# Patient Record
Sex: MALE | Race: WHITE | NOT HISPANIC OR LATINO | URBAN - METROPOLITAN AREA
[De-identification: names, ages, dates, MRNs, and addresses within clinical notes are randomized per-mention and may not be internally consistent; named-entity substitution may affect disease eponyms.]

---

## 2017-01-01 ENCOUNTER — HOSPITAL ENCOUNTER (OUTPATIENT)
Dept: GENERAL RADIOLOGY | Facility: CLINIC | Age: 82
End: 2017-06-08
Attending: ORTHOPAEDIC SURGERY | Admitting: ORTHOPAEDIC SURGERY
Payer: MEDICARE

## 2017-01-01 ENCOUNTER — HOSPITAL ENCOUNTER (OUTPATIENT)
Dept: CARDIOLOGY | Facility: CLINIC | Age: 82
Discharge: HOME | End: 2017-08-18
Attending: INTERNAL MEDICINE

## 2017-01-01 ENCOUNTER — OFFICE VISIT - HEALTHEAST (OUTPATIENT)
Dept: CARDIOLOGY | Facility: CLINIC | Age: 82
End: 2017-01-01

## 2017-01-01 ENCOUNTER — RECORDS - HEALTHEAST (OUTPATIENT)
Dept: ADMINISTRATIVE | Facility: OTHER | Age: 82
End: 2017-01-01

## 2017-01-01 ENCOUNTER — HOSPITAL ENCOUNTER (OUTPATIENT)
Dept: CARDIOLOGY | Facility: CLINIC | Age: 82
Discharge: HOME | End: 2017-12-12
Attending: INTERNAL MEDICINE

## 2017-01-01 ENCOUNTER — APPOINTMENT (OUTPATIENT)
Dept: PHYSICAL THERAPY | Facility: CLINIC | Age: 82
DRG: 470 | End: 2017-01-01
Attending: ORTHOPAEDIC SURGERY
Payer: MEDICARE

## 2017-01-01 ENCOUNTER — APPOINTMENT (OUTPATIENT)
Dept: OCCUPATIONAL THERAPY | Facility: CLINIC | Age: 82
DRG: 470 | End: 2017-01-01
Attending: ORTHOPAEDIC SURGERY
Payer: MEDICARE

## 2017-01-01 ENCOUNTER — ANESTHESIA (OUTPATIENT)
Dept: SURGERY | Facility: CLINIC | Age: 82
DRG: 470 | End: 2017-01-01
Payer: MEDICARE

## 2017-01-01 ENCOUNTER — AMBULATORY - HEALTHEAST (OUTPATIENT)
Dept: CARDIOLOGY | Facility: CLINIC | Age: 82
End: 2017-01-01

## 2017-01-01 ENCOUNTER — HOSPITAL ENCOUNTER (INPATIENT)
Facility: CLINIC | Age: 82
LOS: 4 days | Discharge: HOME | DRG: 470 | End: 2017-09-11
Attending: ORTHOPAEDIC SURGERY | Admitting: ORTHOPAEDIC SURGERY
Payer: MEDICARE

## 2017-01-01 ENCOUNTER — ANESTHESIA EVENT (OUTPATIENT)
Dept: SURGERY | Facility: CLINIC | Age: 82
DRG: 470 | End: 2017-01-01
Payer: MEDICARE

## 2017-01-01 ENCOUNTER — HOSPITAL ENCOUNTER (OUTPATIENT)
Dept: LAB | Facility: CLINIC | Age: 82
Discharge: HOME | End: 2017-08-17
Attending: ORTHOPAEDIC SURGERY | Admitting: ORTHOPAEDIC SURGERY
Payer: MEDICARE

## 2017-01-01 ENCOUNTER — APPOINTMENT (OUTPATIENT)
Dept: GENERAL RADIOLOGY | Facility: CLINIC | Age: 82
DRG: 470 | End: 2017-01-01
Attending: ORTHOPAEDIC SURGERY
Payer: MEDICARE

## 2017-01-01 ENCOUNTER — HOSPITAL ENCOUNTER (OUTPATIENT)
Dept: ULTRASOUND IMAGING | Facility: CLINIC | Age: 82
Discharge: HOME | End: 2017-08-18
Attending: INTERNAL MEDICINE

## 2017-01-01 ENCOUNTER — HOSPITAL ENCOUNTER (OUTPATIENT)
Facility: CLINIC | Age: 82
Discharge: HOME | End: 2017-06-08
Attending: ORTHOPAEDIC SURGERY | Admitting: ORTHOPAEDIC SURGERY
Payer: MEDICARE

## 2017-01-01 ENCOUNTER — COMMUNICATION - HEALTHEAST (OUTPATIENT)
Dept: CARDIOLOGY | Facility: CLINIC | Age: 82
End: 2017-01-01

## 2017-01-01 VITALS
RESPIRATION RATE: 16 BRPM | DIASTOLIC BLOOD PRESSURE: 60 MMHG | TEMPERATURE: 97.6 F | OXYGEN SATURATION: 95 % | WEIGHT: 217.9 LBS | HEIGHT: 76 IN | SYSTOLIC BLOOD PRESSURE: 130 MMHG | BODY MASS INDEX: 26.53 KG/M2

## 2017-01-01 VITALS
SYSTOLIC BLOOD PRESSURE: 141 MMHG | DIASTOLIC BLOOD PRESSURE: 66 MMHG | HEART RATE: 56 BPM | WEIGHT: 224.9 LBS | HEIGHT: 76 IN | TEMPERATURE: 96 F | BODY MASS INDEX: 27.39 KG/M2 | RESPIRATION RATE: 16 BRPM | OXYGEN SATURATION: 93 %

## 2017-01-01 DIAGNOSIS — I10 ESSENTIAL HYPERTENSION WITH GOAL BLOOD PRESSURE LESS THAN 140/90: ICD-10-CM

## 2017-01-01 DIAGNOSIS — I71.40 AAA (ABDOMINAL AORTIC ANEURYSM) (H): ICD-10-CM

## 2017-01-01 DIAGNOSIS — I25.10 ATHEROSCLEROSIS OF NATIVE CORONARY ARTERY OF NATIVE HEART WITHOUT ANGINA PECTORIS: ICD-10-CM

## 2017-01-01 DIAGNOSIS — I49.3 FREQUENT UNIFOCAL PREMATURE VENTRICULAR CONTRACTIONS: ICD-10-CM

## 2017-01-01 DIAGNOSIS — I25.10 CAD (CORONARY ARTERY DISEASE): ICD-10-CM

## 2017-01-01 DIAGNOSIS — R94.39 ABNORMAL STRESS TEST: ICD-10-CM

## 2017-01-01 DIAGNOSIS — I71.40 ANEURYSM OF ABDOMINAL VESSEL (H): ICD-10-CM

## 2017-01-01 DIAGNOSIS — R00.1 BRADYCARDIA: ICD-10-CM

## 2017-01-01 DIAGNOSIS — M54.9 BACK PAIN, UNSPECIFIED BACK LOCATION, UNSPECIFIED BACK PAIN LATERALITY, UNSPECIFIED CHRONICITY: ICD-10-CM

## 2017-01-01 DIAGNOSIS — E78.00 HYPERCHOLESTEREMIA: ICD-10-CM

## 2017-01-01 DIAGNOSIS — Z01.812 PRE-OPERATIVE LABORATORY EXAMINATION: ICD-10-CM

## 2017-01-01 DIAGNOSIS — Z96.651 STATUS POST TOTAL KNEE REPLACEMENT, RIGHT: Primary | ICD-10-CM

## 2017-01-01 LAB
ABO + RH BLD: NORMAL
ABO + RH BLD: NORMAL
ANION GAP SERPL CALCULATED.3IONS-SCNC: 7 MMOL/L (ref 3–14)
AORTIC ROOT: 3.1 CM
BLD GP AB SCN SERPL QL: NORMAL
BLOOD BANK CMNT PATIENT-IMP: NORMAL
BUN SERPL-MCNC: 28 MG/DL (ref 7–30)
CALCIUM SERPL-MCNC: 8.1 MG/DL (ref 8.5–10.1)
CHLORIDE SERPL-SCNC: 99 MMOL/L (ref 94–109)
CO2 SERPL-SCNC: 23 MMOL/L (ref 20–32)
CREAT SERPL-MCNC: 1.03 MG/DL (ref 0.66–1.25)
CREAT SERPL-MCNC: 1.04 MG/DL (ref 0.66–1.25)
CREAT SERPL-MCNC: 1.1 MG/DL (ref 0.66–1.25)
DOP CALC LVOT AREA: 3.14 CM2
DOP CALC LVOT DIAMETER: 2 CM
DOP CALC LVOT PEAK VEL: 119 CM/S
DOP CALC LVOT STROKE VOLUME: 72.8 CM3
DOP CALCLVOT PEAK VEL VTI: 23.2 CM
FRACTIONAL SHORTENING: 26.2 % (ref 28–44)
GFR SERPL CREATININE-BSD FRML MDRD: 64 ML/MIN/1.7M2
GFR SERPL CREATININE-BSD FRML MDRD: 68 ML/MIN/1.7M2
GFR SERPL CREATININE-BSD FRML MDRD: 69 ML/MIN/1.7M2
GLUCOSE BLDC GLUCOMTR-MCNC: 100 MG/DL (ref 70–99)
GLUCOSE BLDC GLUCOMTR-MCNC: 110 MG/DL (ref 70–99)
GLUCOSE BLDC GLUCOMTR-MCNC: 113 MG/DL (ref 70–99)
GLUCOSE BLDC GLUCOMTR-MCNC: 115 MG/DL (ref 70–99)
GLUCOSE BLDC GLUCOMTR-MCNC: 121 MG/DL (ref 70–99)
GLUCOSE BLDC GLUCOMTR-MCNC: 136 MG/DL (ref 70–99)
GLUCOSE BLDC GLUCOMTR-MCNC: 149 MG/DL (ref 70–99)
GLUCOSE BLDC GLUCOMTR-MCNC: 151 MG/DL (ref 70–99)
GLUCOSE BLDC GLUCOMTR-MCNC: 151 MG/DL (ref 70–99)
GLUCOSE BLDC GLUCOMTR-MCNC: 217 MG/DL (ref 70–99)
GLUCOSE BLDC GLUCOMTR-MCNC: 98 MG/DL (ref 70–99)
GLUCOSE SERPL-MCNC: 107 MG/DL (ref 70–99)
GLUCOSE SERPL-MCNC: 119 MG/DL (ref 70–99)
GLUCOSE SERPL-MCNC: 128 MG/DL (ref 70–99)
HGB BLD-MCNC: 10.9 G/DL (ref 13.3–17.7)
HGB BLD-MCNC: 11.5 G/DL (ref 13.3–17.7)
HGB BLD-MCNC: 14.6 G/DL (ref 13.3–17.7)
INTERPRETATION ECG - MUSE: NORMAL
INTERVENTRICULAR SEPTUM IN END DIASTOLE: 1.1 CM (ref 0.6–1)
IVS/PW RATIO: 1.1
LEFT ATRIUM AREA: 23.4 CM2
LEFT ATRIUM SIZE: 4 CM
LEFT ATRIUM TO AORTIC ROOT RATIO: 1.29 NO UNITS
LEFT VENTRICULAR INTERNAL DIMENSION IN DIASTOLE: 4.2 CM (ref 4.2–5.8)
LEFT VENTRICULAR INTERNAL DIMENSION IN SYSTOLE: 3.1 CM (ref 2.5–4)
LEFT VENTRICULAR MASS: 147 G
LEFT VENTRICULAR OUTFLOW TRACT MEAN GRADIENT: 3 MMHG
LEFT VENTRICULAR OUTFLOW TRACT MEAN VELOCITY: 76.7 CM/S
LEFT VENTRICULAR OUTFLOW TRACT PEAK GRADIENT: 6 MMHG
LEFT VENTRICULAR POSTERIOR WALL IN END DIASTOLE: 1 CM (ref 0.6–1)
MITRAL VALVE E/A RATIO: 0.7
MRSA DNA SPEC QL NAA+PROBE: NEGATIVE
MV AVERAGE E/E' RATIO: 10.1 CM/S
MV DECELERATION TIME: 257 MS
MV E'TISSUE VEL-LAT: 6.14 CM/S
MV E'TISSUE VEL-MED: 6.04 CM/S
MV LATERAL E/E' RATIO: 10
MV MEDIAL E/E' RATIO: 10.2
MV PEAK A VELOCITY: 93.8 CM/S
MV PEAK E VELOCITY: 61.7 CM/S
PLATELET # BLD AUTO: 128 10E9/L (ref 150–450)
PLATELET # BLD AUTO: 181 10E9/L (ref 150–450)
POTASSIUM SERPL-SCNC: 4.5 MMOL/L (ref 3.4–5.3)
POTASSIUM SERPL-SCNC: 4.9 MMOL/L (ref 3.4–5.3)
SODIUM SERPL-SCNC: 129 MMOL/L (ref 133–144)
SPECIMEN EXP DATE BLD: NORMAL
SPECIMEN SOURCE: NORMAL
TRICUSPID REGURGITATION PEAK PRESSURE GRADIENT: 39.7 MMHG
TRICUSPID VALVE ANULAR PLANE SYSTOLIC EXCURSION: 2.3 CM
TRICUSPID VALVE PEAK REGURGITANT VELOCITY: 315 CM/S

## 2017-01-01 PROCEDURE — 85018 HEMOGLOBIN: CPT | Performed by: PHYSICIAN ASSISTANT

## 2017-01-01 PROCEDURE — 25000128 H RX IP 250 OP 636: Performed by: ANESTHESIOLOGY

## 2017-01-01 PROCEDURE — 25000132 ZZH RX MED GY IP 250 OP 250 PS 637: Mod: GY | Performed by: PHYSICIAN ASSISTANT

## 2017-01-01 PROCEDURE — 36415 COLL VENOUS BLD VENIPUNCTURE: CPT | Performed by: ORTHOPAEDIC SURGERY

## 2017-01-01 PROCEDURE — 87640 STAPH A DNA AMP PROBE: CPT | Performed by: ORTHOPAEDIC SURGERY

## 2017-01-01 PROCEDURE — 27810169 ZZH OR IMPLANT GENERAL: Performed by: ORTHOPAEDIC SURGERY

## 2017-01-01 PROCEDURE — 40000986 XR KNEE PORT RT 1/2 VW: Mod: RT

## 2017-01-01 PROCEDURE — 40000133 ZZH STATISTIC OT WARD VISIT

## 2017-01-01 PROCEDURE — A9270 NON-COVERED ITEM OR SERVICE: HCPCS | Mod: GY | Performed by: PHYSICIAN ASSISTANT

## 2017-01-01 PROCEDURE — 0SRC0J9 REPLACEMENT OF RIGHT KNEE JOINT WITH SYNTHETIC SUBSTITUTE, CEMENTED, OPEN APPROACH: ICD-10-PCS | Performed by: ORTHOPAEDIC SURGERY

## 2017-01-01 PROCEDURE — 40000193 ZZH STATISTIC PT WARD VISIT

## 2017-01-01 PROCEDURE — 25000128 H RX IP 250 OP 636: Performed by: ORTHOPAEDIC SURGERY

## 2017-01-01 PROCEDURE — 00000146 ZZHCL STATISTIC GLUCOSE BY METER IP

## 2017-01-01 PROCEDURE — 97535 SELF CARE MNGMENT TRAINING: CPT | Mod: GO | Performed by: OCCUPATIONAL THERAPIST

## 2017-01-01 PROCEDURE — 85049 AUTOMATED PLATELET COUNT: CPT | Performed by: ORTHOPAEDIC SURGERY

## 2017-01-01 PROCEDURE — 86850 RBC ANTIBODY SCREEN: CPT | Performed by: ANESTHESIOLOGY

## 2017-01-01 PROCEDURE — 84132 ASSAY OF SERUM POTASSIUM: CPT | Performed by: PHYSICIAN ASSISTANT

## 2017-01-01 PROCEDURE — 97110 THERAPEUTIC EXERCISES: CPT | Mod: GP | Performed by: PHYSICAL THERAPIST

## 2017-01-01 PROCEDURE — 36000063 ZZH SURGERY LEVEL 4 EA 15 ADDTL MIN: Performed by: ORTHOPAEDIC SURGERY

## 2017-01-01 PROCEDURE — 97110 THERAPEUTIC EXERCISES: CPT | Mod: GP

## 2017-01-01 PROCEDURE — 27210794 ZZH OR GENERAL SUPPLY STERILE: Performed by: ORTHOPAEDIC SURGERY

## 2017-01-01 PROCEDURE — 85018 HEMOGLOBIN: CPT | Performed by: ORTHOPAEDIC SURGERY

## 2017-01-01 PROCEDURE — 97116 GAIT TRAINING THERAPY: CPT | Mod: GP

## 2017-01-01 PROCEDURE — 25000128 H RX IP 250 OP 636: Performed by: NURSE ANESTHETIST, CERTIFIED REGISTERED

## 2017-01-01 PROCEDURE — 99207 ZZC CDG-MDM COMPONENT: MEETS MODERATE - UP CODED: CPT | Performed by: INTERNAL MEDICINE

## 2017-01-01 PROCEDURE — 62323 NJX INTERLAMINAR LMBR/SAC: CPT

## 2017-01-01 PROCEDURE — A9270 NON-COVERED ITEM OR SERVICE: HCPCS | Mod: GY | Performed by: ORTHOPAEDIC SURGERY

## 2017-01-01 PROCEDURE — 36000093 ZZH SURGERY LEVEL 4 1ST 30 MIN: Performed by: ORTHOPAEDIC SURGERY

## 2017-01-01 PROCEDURE — 82947 ASSAY GLUCOSE BLOOD QUANT: CPT | Performed by: PHYSICIAN ASSISTANT

## 2017-01-01 PROCEDURE — 82947 ASSAY GLUCOSE BLOOD QUANT: CPT | Performed by: ORTHOPAEDIC SURGERY

## 2017-01-01 PROCEDURE — 25000125 ZZHC RX 250: Performed by: ORTHOPAEDIC SURGERY

## 2017-01-01 PROCEDURE — 12000007 ZZH R&B INTERMEDIATE

## 2017-01-01 PROCEDURE — 27210995 ZZH RX 272: Performed by: ORTHOPAEDIC SURGERY

## 2017-01-01 PROCEDURE — 99222 1ST HOSP IP/OBS MODERATE 55: CPT | Performed by: PHYSICIAN ASSISTANT

## 2017-01-01 PROCEDURE — 25000132 ZZH RX MED GY IP 250 OP 250 PS 637: Mod: GY | Performed by: ORTHOPAEDIC SURGERY

## 2017-01-01 PROCEDURE — 97530 THERAPEUTIC ACTIVITIES: CPT | Mod: GP | Performed by: PHYSICAL THERAPIST

## 2017-01-01 PROCEDURE — 97530 THERAPEUTIC ACTIVITIES: CPT | Mod: GO | Performed by: OCCUPATIONAL THERAPIST

## 2017-01-01 PROCEDURE — 97530 THERAPEUTIC ACTIVITIES: CPT | Mod: GO

## 2017-01-01 PROCEDURE — 99232 SBSQ HOSP IP/OBS MODERATE 35: CPT | Performed by: HOSPITALIST

## 2017-01-01 PROCEDURE — 97161 PT EVAL LOW COMPLEX 20 MIN: CPT | Mod: GP | Performed by: PHYSICAL THERAPIST

## 2017-01-01 PROCEDURE — 85049 AUTOMATED PLATELET COUNT: CPT | Performed by: PHYSICIAN ASSISTANT

## 2017-01-01 PROCEDURE — 25000125 ZZHC RX 250: Performed by: PHYSICIAN ASSISTANT

## 2017-01-01 PROCEDURE — 86901 BLOOD TYPING SEROLOGIC RH(D): CPT | Performed by: ANESTHESIOLOGY

## 2017-01-01 PROCEDURE — 97530 THERAPEUTIC ACTIVITIES: CPT | Mod: GP

## 2017-01-01 PROCEDURE — 87641 MR-STAPH DNA AMP PROBE: CPT | Performed by: ORTHOPAEDIC SURGERY

## 2017-01-01 PROCEDURE — C1776 JOINT DEVICE (IMPLANTABLE): HCPCS | Performed by: ORTHOPAEDIC SURGERY

## 2017-01-01 PROCEDURE — 25000128 H RX IP 250 OP 636: Performed by: PHYSICIAN ASSISTANT

## 2017-01-01 PROCEDURE — 40000830 ZZHCL STATISTIC STAPH AUREUS METH RESIST SCREEN PCR: Performed by: ORTHOPAEDIC SURGERY

## 2017-01-01 PROCEDURE — 82565 ASSAY OF CREATININE: CPT | Performed by: ORTHOPAEDIC SURGERY

## 2017-01-01 PROCEDURE — 37000008 ZZH ANESTHESIA TECHNICAL FEE, 1ST 30 MIN: Performed by: ORTHOPAEDIC SURGERY

## 2017-01-01 PROCEDURE — 40000169 ZZH STATISTIC PRE-PROCEDURE ASSESSMENT I: Performed by: ORTHOPAEDIC SURGERY

## 2017-01-01 PROCEDURE — 99207 ZZC CONSULT E&M CHANGED TO INITIAL LEVEL: CPT | Performed by: PHYSICIAN ASSISTANT

## 2017-01-01 PROCEDURE — 99232 SBSQ HOSP IP/OBS MODERATE 35: CPT | Performed by: INTERNAL MEDICINE

## 2017-01-01 PROCEDURE — 71000013 ZZH RECOVERY PHASE 1 LEVEL 1 EA ADDTL HR: Performed by: ORTHOPAEDIC SURGERY

## 2017-01-01 PROCEDURE — 40000193 ZZH STATISTIC PT WARD VISIT: Performed by: PHYSICAL THERAPIST

## 2017-01-01 PROCEDURE — 40000133 ZZH STATISTIC OT WARD VISIT: Performed by: OCCUPATIONAL THERAPIST

## 2017-01-01 PROCEDURE — 25800025 ZZH RX 258: Performed by: ORTHOPAEDIC SURGERY

## 2017-01-01 PROCEDURE — 40000829 ZZHCL STATISTIC STAPH AUREUS SUSCEPT SCREEN PCR: Performed by: ORTHOPAEDIC SURGERY

## 2017-01-01 PROCEDURE — 71000012 ZZH RECOVERY PHASE 1 LEVEL 1 FIRST HR: Performed by: ORTHOPAEDIC SURGERY

## 2017-01-01 PROCEDURE — 25000125 ZZHC RX 250: Performed by: NURSE ANESTHETIST, CERTIFIED REGISTERED

## 2017-01-01 PROCEDURE — 40000863 ZZH STATISTIC RADIOLOGY XRAY, US, CT, MAR, NM

## 2017-01-01 PROCEDURE — 86900 BLOOD TYPING SEROLOGIC ABO: CPT | Performed by: ANESTHESIOLOGY

## 2017-01-01 PROCEDURE — 93010 ELECTROCARDIOGRAM REPORT: CPT | Performed by: INTERNAL MEDICINE

## 2017-01-01 PROCEDURE — 97535 SELF CARE MNGMENT TRAINING: CPT | Mod: GO

## 2017-01-01 PROCEDURE — 25500064 ZZH RX 255 OP 636: Performed by: ORTHOPAEDIC SURGERY

## 2017-01-01 PROCEDURE — 36415 COLL VENOUS BLD VENIPUNCTURE: CPT | Performed by: PHYSICIAN ASSISTANT

## 2017-01-01 PROCEDURE — 82565 ASSAY OF CREATININE: CPT | Performed by: PHYSICIAN ASSISTANT

## 2017-01-01 PROCEDURE — 37000009 ZZH ANESTHESIA TECHNICAL FEE, EACH ADDTL 15 MIN: Performed by: ORTHOPAEDIC SURGERY

## 2017-01-01 PROCEDURE — 80048 BASIC METABOLIC PNL TOTAL CA: CPT | Performed by: ORTHOPAEDIC SURGERY

## 2017-01-01 PROCEDURE — 93005 ELECTROCARDIOGRAM TRACING: CPT

## 2017-01-01 PROCEDURE — 97166 OT EVAL MOD COMPLEX 45 MIN: CPT | Mod: GO | Performed by: OCCUPATIONAL THERAPIST

## 2017-01-01 DEVICE — BONE CEMENT SIMPLEX W/TOBRAMYCIN 6197-9-001: Type: IMPLANTABLE DEVICE | Site: KNEE | Status: FUNCTIONAL

## 2017-01-01 DEVICE — BONE CEMENT SIMPLEX FULL DOSE 6191-1-001: Type: IMPLANTABLE DEVICE | Site: KNEE | Status: FUNCTIONAL

## 2017-01-01 DEVICE — IMPLANTABLE DEVICE: Type: IMPLANTABLE DEVICE | Site: KNEE | Status: FUNCTIONAL

## 2017-01-01 DEVICE — BONE CEMENT SIMPLEX 1/2 DOSE 6188-1-001: Type: IMPLANTABLE DEVICE | Site: KNEE | Status: FUNCTIONAL

## 2017-01-01 RX ORDER — FENTANYL CITRATE 0.05 MG/ML
25-50 INJECTION, SOLUTION INTRAMUSCULAR; INTRAVENOUS
Status: DISCONTINUED | OUTPATIENT
Start: 2017-01-01 | End: 2017-01-01 | Stop reason: HOSPADM

## 2017-01-01 RX ORDER — CHLORHEXIDINE GLUCONATE 40 MG/ML
SOLUTION TOPICAL ONCE
Status: DISCONTINUED | OUTPATIENT
Start: 2017-01-01 | End: 2017-01-01 | Stop reason: HOSPADM

## 2017-01-01 RX ORDER — IOPAMIDOL 408 MG/ML
10 INJECTION, SOLUTION INTRATHECAL ONCE
Status: COMPLETED | OUTPATIENT
Start: 2017-01-01 | End: 2017-01-01

## 2017-01-01 RX ORDER — GLYCOPYRROLATE 0.2 MG/ML
INJECTION, SOLUTION INTRAMUSCULAR; INTRAVENOUS PRN
Status: DISCONTINUED | OUTPATIENT
Start: 2017-01-01 | End: 2017-01-01

## 2017-01-01 RX ORDER — AMOXICILLIN 250 MG
1-2 CAPSULE ORAL 2 TIMES DAILY PRN
Qty: 60 TABLET | Refills: 0 | Status: SHIPPED | OUTPATIENT
Start: 2017-01-01 | End: 2018-01-01

## 2017-01-01 RX ORDER — CEFAZOLIN SODIUM 2 G/100ML
2 INJECTION, SOLUTION INTRAVENOUS EVERY 8 HOURS
Status: COMPLETED | OUTPATIENT
Start: 2017-01-01 | End: 2017-01-01

## 2017-01-01 RX ORDER — KETOROLAC TROMETHAMINE 15 MG/ML
15 INJECTION, SOLUTION INTRAMUSCULAR; INTRAVENOUS EVERY 6 HOURS
Status: COMPLETED | OUTPATIENT
Start: 2017-01-01 | End: 2017-01-01

## 2017-01-01 RX ORDER — MAGNESIUM HYDROXIDE 1200 MG/15ML
LIQUID ORAL PRN
Status: DISCONTINUED | OUTPATIENT
Start: 2017-01-01 | End: 2017-01-01 | Stop reason: HOSPADM

## 2017-01-01 RX ORDER — FENTANYL CITRATE 50 UG/ML
25-50 INJECTION, SOLUTION INTRAMUSCULAR; INTRAVENOUS
Status: DISCONTINUED | OUTPATIENT
Start: 2017-01-01 | End: 2017-01-01 | Stop reason: HOSPADM

## 2017-01-01 RX ORDER — ACETAMINOPHEN 325 MG/1
650 TABLET ORAL EVERY 4 HOURS PRN
Status: DISCONTINUED | OUTPATIENT
Start: 2017-01-01 | End: 2017-01-01 | Stop reason: HOSPADM

## 2017-01-01 RX ORDER — BUPIVACAINE HYDROCHLORIDE 7.5 MG/ML
INJECTION, SOLUTION INTRASPINAL PRN
Status: DISCONTINUED | OUTPATIENT
Start: 2017-01-01 | End: 2017-01-01

## 2017-01-01 RX ORDER — TEMAZEPAM 7.5 MG/1
7.5 CAPSULE ORAL
Status: DISCONTINUED | OUTPATIENT
Start: 2017-01-01 | End: 2017-01-01

## 2017-01-01 RX ORDER — HYDROMORPHONE HYDROCHLORIDE 1 MG/ML
.3-.5 INJECTION, SOLUTION INTRAMUSCULAR; INTRAVENOUS; SUBCUTANEOUS
Status: DISCONTINUED | OUTPATIENT
Start: 2017-01-01 | End: 2017-01-01

## 2017-01-01 RX ORDER — LATANOPROST 50 UG/ML
1 SOLUTION/ DROPS OPHTHALMIC AT BEDTIME
COMMUNITY

## 2017-01-01 RX ORDER — LATANOPROST 50 UG/ML
1 SOLUTION/ DROPS OPHTHALMIC AT BEDTIME
Status: DISCONTINUED | OUTPATIENT
Start: 2017-01-01 | End: 2017-01-01

## 2017-01-01 RX ORDER — DEXTROSE MONOHYDRATE, SODIUM CHLORIDE, AND POTASSIUM CHLORIDE 50; 1.49; 4.5 G/1000ML; G/1000ML; G/1000ML
INJECTION, SOLUTION INTRAVENOUS CONTINUOUS
Status: DISCONTINUED | OUTPATIENT
Start: 2017-01-01 | End: 2017-01-01 | Stop reason: HOSPADM

## 2017-01-01 RX ORDER — NALOXONE HYDROCHLORIDE 0.4 MG/ML
.1-.4 INJECTION, SOLUTION INTRAMUSCULAR; INTRAVENOUS; SUBCUTANEOUS
Status: DISCONTINUED | OUTPATIENT
Start: 2017-01-01 | End: 2017-01-01 | Stop reason: HOSPADM

## 2017-01-01 RX ORDER — TAMSULOSIN HYDROCHLORIDE 0.4 MG/1
0.4 CAPSULE ORAL AT BEDTIME
COMMUNITY
Start: 2017-01-01 | End: 2018-01-01

## 2017-01-01 RX ORDER — ONDANSETRON 4 MG/1
4 TABLET, ORALLY DISINTEGRATING ORAL EVERY 6 HOURS PRN
Status: DISCONTINUED | OUTPATIENT
Start: 2017-01-01 | End: 2017-01-01 | Stop reason: HOSPADM

## 2017-01-01 RX ORDER — AMOXICILLIN 250 MG
1-2 CAPSULE ORAL 2 TIMES DAILY
Status: DISCONTINUED | OUTPATIENT
Start: 2017-01-01 | End: 2017-01-01 | Stop reason: HOSPADM

## 2017-01-01 RX ORDER — TAMSULOSIN HYDROCHLORIDE 0.4 MG/1
0.4 CAPSULE ORAL AT BEDTIME
Status: DISCONTINUED | OUTPATIENT
Start: 2017-01-01 | End: 2017-01-01 | Stop reason: HOSPADM

## 2017-01-01 RX ORDER — GABAPENTIN 300 MG/1
300 CAPSULE ORAL AT BEDTIME
Status: DISCONTINUED | OUTPATIENT
Start: 2017-01-01 | End: 2017-01-01 | Stop reason: HOSPADM

## 2017-01-01 RX ORDER — PROCHLORPERAZINE MALEATE 5 MG
5 TABLET ORAL EVERY 6 HOURS PRN
Status: DISCONTINUED | OUTPATIENT
Start: 2017-01-01 | End: 2017-01-01 | Stop reason: HOSPADM

## 2017-01-01 RX ORDER — DEXTROSE MONOHYDRATE 25 G/50ML
25-50 INJECTION, SOLUTION INTRAVENOUS
Status: DISCONTINUED | OUTPATIENT
Start: 2017-01-01 | End: 2017-01-01 | Stop reason: HOSPADM

## 2017-01-01 RX ORDER — ONDANSETRON 4 MG/1
4 TABLET, ORALLY DISINTEGRATING ORAL EVERY 30 MIN PRN
Status: DISCONTINUED | OUTPATIENT
Start: 2017-01-01 | End: 2017-01-01 | Stop reason: HOSPADM

## 2017-01-01 RX ORDER — TRAVOPROST OPHTHALMIC SOLUTION 0.04 MG/ML
1 SOLUTION OPHTHALMIC AT BEDTIME
Status: ON HOLD | COMMUNITY
End: 2017-01-01

## 2017-01-01 RX ORDER — SODIUM CHLORIDE, SODIUM LACTATE, POTASSIUM CHLORIDE, CALCIUM CHLORIDE 600; 310; 30; 20 MG/100ML; MG/100ML; MG/100ML; MG/100ML
INJECTION, SOLUTION INTRAVENOUS CONTINUOUS
Status: DISCONTINUED | OUTPATIENT
Start: 2017-01-01 | End: 2017-01-01 | Stop reason: HOSPADM

## 2017-01-01 RX ORDER — OXYCODONE HYDROCHLORIDE 5 MG/1
2.5-5 TABLET ORAL EVERY 4 HOURS PRN
Qty: 40 TABLET | Refills: 0 | Status: SHIPPED | OUTPATIENT
Start: 2017-01-01 | End: 2018-01-01

## 2017-01-01 RX ORDER — ONDANSETRON 2 MG/ML
4 INJECTION INTRAMUSCULAR; INTRAVENOUS EVERY 6 HOURS PRN
Status: DISCONTINUED | OUTPATIENT
Start: 2017-01-01 | End: 2017-01-01 | Stop reason: HOSPADM

## 2017-01-01 RX ORDER — NICOTINE POLACRILEX 4 MG
15-30 LOZENGE BUCCAL
Status: DISCONTINUED | OUTPATIENT
Start: 2017-01-01 | End: 2017-01-01 | Stop reason: HOSPADM

## 2017-01-01 RX ORDER — NITROGLYCERIN 0.4 MG/1
0.4 TABLET SUBLINGUAL EVERY 5 MIN PRN
Status: DISCONTINUED | OUTPATIENT
Start: 2017-01-01 | End: 2017-01-01 | Stop reason: HOSPADM

## 2017-01-01 RX ORDER — MEPERIDINE HYDROCHLORIDE 25 MG/ML
12.5 INJECTION INTRAMUSCULAR; INTRAVENOUS; SUBCUTANEOUS EVERY 5 MIN PRN
Status: DISCONTINUED | OUTPATIENT
Start: 2017-01-01 | End: 2017-01-01 | Stop reason: HOSPADM

## 2017-01-01 RX ORDER — LATANOPROST 50 UG/ML
1 SOLUTION/ DROPS OPHTHALMIC AT BEDTIME
Status: DISCONTINUED | OUTPATIENT
Start: 2017-01-01 | End: 2017-01-01 | Stop reason: HOSPADM

## 2017-01-01 RX ORDER — CEFAZOLIN SODIUM 2 G/100ML
2 INJECTION, SOLUTION INTRAVENOUS
Status: COMPLETED | OUTPATIENT
Start: 2017-01-01 | End: 2017-01-01

## 2017-01-01 RX ORDER — EPHEDRINE SULFATE 50 MG/ML
INJECTION, SOLUTION INTRAMUSCULAR; INTRAVENOUS; SUBCUTANEOUS PRN
Status: DISCONTINUED | OUTPATIENT
Start: 2017-01-01 | End: 2017-01-01

## 2017-01-01 RX ORDER — CEFAZOLIN SODIUM 1 G/3ML
1 INJECTION, POWDER, FOR SOLUTION INTRAMUSCULAR; INTRAVENOUS SEE ADMIN INSTRUCTIONS
Status: DISCONTINUED | OUTPATIENT
Start: 2017-01-01 | End: 2017-01-01 | Stop reason: HOSPADM

## 2017-01-01 RX ORDER — OXYCODONE HYDROCHLORIDE 5 MG/1
5-10 TABLET ORAL
Status: DISCONTINUED | OUTPATIENT
Start: 2017-01-01 | End: 2017-01-01

## 2017-01-01 RX ORDER — LISINOPRIL 5 MG/1
5 TABLET ORAL DAILY
Status: DISCONTINUED | OUTPATIENT
Start: 2017-01-01 | End: 2017-01-01 | Stop reason: HOSPADM

## 2017-01-01 RX ORDER — TRAVOPROST OPHTHALMIC SOLUTION 0.04 MG/ML
1 SOLUTION OPHTHALMIC AT BEDTIME
Status: DISCONTINUED | OUTPATIENT
Start: 2017-01-01 | End: 2017-01-01

## 2017-01-01 RX ORDER — BUPIVACAINE HYDROCHLORIDE AND EPINEPHRINE 5; 5 MG/ML; UG/ML
INJECTION, SOLUTION PERINEURAL PRN
Status: DISCONTINUED | OUTPATIENT
Start: 2017-01-01 | End: 2017-01-01 | Stop reason: HOSPADM

## 2017-01-01 RX ORDER — FENTANYL CITRATE 50 UG/ML
INJECTION, SOLUTION INTRAMUSCULAR; INTRAVENOUS PRN
Status: DISCONTINUED | OUTPATIENT
Start: 2017-01-01 | End: 2017-01-01

## 2017-01-01 RX ORDER — LIDOCAINE 40 MG/G
CREAM TOPICAL
Status: DISCONTINUED | OUTPATIENT
Start: 2017-01-01 | End: 2017-01-01 | Stop reason: HOSPADM

## 2017-01-01 RX ORDER — DEXAMETHASONE SODIUM PHOSPHATE 10 MG/ML
10 INJECTION, SOLUTION INTRAMUSCULAR; INTRAVENOUS ONCE
Status: COMPLETED | OUTPATIENT
Start: 2017-01-01 | End: 2017-01-01

## 2017-01-01 RX ORDER — HYDROXYZINE HYDROCHLORIDE 10 MG/1
10 TABLET, FILM COATED ORAL EVERY 6 HOURS PRN
Status: DISCONTINUED | OUTPATIENT
Start: 2017-01-01 | End: 2017-01-01 | Stop reason: HOSPADM

## 2017-01-01 RX ORDER — ACETAMINOPHEN 325 MG/1
975 TABLET ORAL EVERY 8 HOURS
Status: DISPENSED | OUTPATIENT
Start: 2017-01-01 | End: 2017-01-01

## 2017-01-01 RX ORDER — LEVOTHYROXINE SODIUM 25 UG/1
25 TABLET ORAL
Status: DISCONTINUED | OUTPATIENT
Start: 2017-01-01 | End: 2017-01-01 | Stop reason: HOSPADM

## 2017-01-01 RX ORDER — BUSPIRONE HYDROCHLORIDE 10 MG/1
10 TABLET ORAL 2 TIMES DAILY PRN
Status: DISCONTINUED | OUTPATIENT
Start: 2017-01-01 | End: 2017-01-01 | Stop reason: HOSPADM

## 2017-01-01 RX ORDER — KETOROLAC TROMETHAMINE 30 MG/ML
INJECTION, SOLUTION INTRAMUSCULAR; INTRAVENOUS PRN
Status: DISCONTINUED | OUTPATIENT
Start: 2017-01-01 | End: 2017-01-01 | Stop reason: HOSPADM

## 2017-01-01 RX ORDER — ONDANSETRON 2 MG/ML
4 INJECTION INTRAMUSCULAR; INTRAVENOUS EVERY 30 MIN PRN
Status: DISCONTINUED | OUTPATIENT
Start: 2017-01-01 | End: 2017-01-01 | Stop reason: HOSPADM

## 2017-01-01 RX ORDER — PROPOFOL 10 MG/ML
INJECTION, EMULSION INTRAVENOUS CONTINUOUS PRN
Status: DISCONTINUED | OUTPATIENT
Start: 2017-01-01 | End: 2017-01-01

## 2017-01-01 RX ORDER — METFORMIN HCL 500 MG
500 TABLET, EXTENDED RELEASE 24 HR ORAL AT BEDTIME
Status: DISCONTINUED | OUTPATIENT
Start: 2017-01-01 | End: 2017-01-01 | Stop reason: HOSPADM

## 2017-01-01 RX ORDER — ACETAMINOPHEN 500 MG
1000 TABLET ORAL ONCE
Status: DISCONTINUED | OUTPATIENT
Start: 2017-01-01 | End: 2017-01-01 | Stop reason: HOSPADM

## 2017-01-01 RX ADMIN — KETOROLAC TROMETHAMINE 15 MG: 15 INJECTION, SOLUTION INTRAMUSCULAR; INTRAVENOUS at 08:22

## 2017-01-01 RX ADMIN — LIDOCAINE HYDROCHLORIDE 6 ML: 10 INJECTION, SOLUTION EPIDURAL; INFILTRATION; INTRACAUDAL; PERINEURAL at 08:55

## 2017-01-01 RX ADMIN — ROPIVACAINE HYDROCHLORIDE 20 ML GIVEN: 5 INJECTION, SOLUTION EPIDURAL; INFILTRATION; PERINEURAL at 10:15

## 2017-01-01 RX ADMIN — CEFAZOLIN SODIUM 2 G: 2 INJECTION, SOLUTION INTRAVENOUS at 12:30

## 2017-01-01 RX ADMIN — OXYCODONE HYDROCHLORIDE 2.5 MG: 5 TABLET ORAL at 08:25

## 2017-01-01 RX ADMIN — TRANEXAMIC ACID 1 G: 100 INJECTION, SOLUTION INTRAVENOUS at 12:31

## 2017-01-01 RX ADMIN — ENOXAPARIN SODIUM 40 MG: 40 INJECTION SUBCUTANEOUS at 13:57

## 2017-01-01 RX ADMIN — ACETAMINOPHEN 975 MG: 325 TABLET, FILM COATED ORAL at 21:33

## 2017-01-01 RX ADMIN — KETOROLAC TROMETHAMINE 15 MG: 15 INJECTION, SOLUTION INTRAMUSCULAR; INTRAVENOUS at 20:22

## 2017-01-01 RX ADMIN — MICONAZOLE NITRATE: 2 POWDER TOPICAL at 21:31

## 2017-01-01 RX ADMIN — GABAPENTIN 300 MG: 300 CAPSULE ORAL at 20:45

## 2017-01-01 RX ADMIN — LISINOPRIL 5 MG: 5 TABLET ORAL at 08:03

## 2017-01-01 RX ADMIN — CEFAZOLIN SODIUM 2 G: 2 INJECTION, SOLUTION INTRAVENOUS at 07:06

## 2017-01-01 RX ADMIN — TAMSULOSIN HYDROCHLORIDE 0.4 MG: 0.4 CAPSULE ORAL at 21:31

## 2017-01-01 RX ADMIN — PHENYLEPHRINE HYDROCHLORIDE 50 MCG: 10 INJECTION, SOLUTION INTRAMUSCULAR; INTRAVENOUS; SUBCUTANEOUS at 13:20

## 2017-01-01 RX ADMIN — TAMSULOSIN HYDROCHLORIDE 0.4 MG: 0.4 CAPSULE ORAL at 21:33

## 2017-01-01 RX ADMIN — GLYCOPYRROLATE 0.2 MG: 0.2 INJECTION, SOLUTION INTRAMUSCULAR; INTRAVENOUS at 12:00

## 2017-01-01 RX ADMIN — LATANOPROST 1 DROP: 50 SOLUTION/ DROPS OPHTHALMIC at 21:34

## 2017-01-01 RX ADMIN — OXYCODONE HYDROCHLORIDE 2.5 MG: 5 TABLET ORAL at 16:24

## 2017-01-01 RX ADMIN — ACETAMINOPHEN 975 MG: 325 TABLET, FILM COATED ORAL at 03:10

## 2017-01-01 RX ADMIN — SENNOSIDES AND DOCUSATE SODIUM 2 TABLET: 8.6; 5 TABLET ORAL at 08:03

## 2017-01-01 RX ADMIN — SENNOSIDES AND DOCUSATE SODIUM 2 TABLET: 8.6; 5 TABLET ORAL at 21:25

## 2017-01-01 RX ADMIN — GABAPENTIN 300 MG: 300 CAPSULE ORAL at 21:33

## 2017-01-01 RX ADMIN — KETOROLAC TROMETHAMINE 15 MG: 15 INJECTION, SOLUTION INTRAMUSCULAR; INTRAVENOUS at 13:57

## 2017-01-01 RX ADMIN — LATANOPROST 1 DROP: 50 SOLUTION/ DROPS OPHTHALMIC at 22:14

## 2017-01-01 RX ADMIN — CEFAZOLIN SODIUM 1 G: 2 INJECTION, SOLUTION INTRAVENOUS at 14:30

## 2017-01-01 RX ADMIN — SENNOSIDES AND DOCUSATE SODIUM 1 TABLET: 8.6; 5 TABLET ORAL at 08:22

## 2017-01-01 RX ADMIN — SODIUM CHLORIDE, POTASSIUM CHLORIDE, SODIUM LACTATE AND CALCIUM CHLORIDE: 600; 310; 30; 20 INJECTION, SOLUTION INTRAVENOUS at 10:13

## 2017-01-01 RX ADMIN — LATANOPROST 1 DROP: 50 SOLUTION/ DROPS OPHTHALMIC at 20:46

## 2017-01-01 RX ADMIN — PROPOFOL 25 MCG/KG/MIN: 10 INJECTION, EMULSION INTRAVENOUS at 12:15

## 2017-01-01 RX ADMIN — ENOXAPARIN SODIUM 40 MG: 40 INJECTION SUBCUTANEOUS at 13:48

## 2017-01-01 RX ADMIN — PHENYLEPHRINE HYDROCHLORIDE 100 MCG: 10 INJECTION, SOLUTION INTRAMUSCULAR; INTRAVENOUS; SUBCUTANEOUS at 12:16

## 2017-01-01 RX ADMIN — OXYCODONE HYDROCHLORIDE 2.5 MG: 5 TABLET ORAL at 02:52

## 2017-01-01 RX ADMIN — SENNOSIDES AND DOCUSATE SODIUM 2 TABLET: 8.6; 5 TABLET ORAL at 08:36

## 2017-01-01 RX ADMIN — ACETAMINOPHEN 975 MG: 325 TABLET, FILM COATED ORAL at 12:58

## 2017-01-01 RX ADMIN — SENNOSIDES AND DOCUSATE SODIUM 1 TABLET: 8.6; 5 TABLET ORAL at 21:31

## 2017-01-01 RX ADMIN — LEVOTHYROXINE SODIUM 25 MCG: 25 TABLET ORAL at 08:36

## 2017-01-01 RX ADMIN — FENTANYL CITRATE 50 MCG: 50 INJECTION, SOLUTION INTRAMUSCULAR; INTRAVENOUS at 16:19

## 2017-01-01 RX ADMIN — SENNOSIDES AND DOCUSATE SODIUM 2 TABLET: 8.6; 5 TABLET ORAL at 21:33

## 2017-01-01 RX ADMIN — ACETAMINOPHEN 975 MG: 325 TABLET, FILM COATED ORAL at 04:36

## 2017-01-01 RX ADMIN — SENNOSIDES AND DOCUSATE SODIUM 2 TABLET: 8.6; 5 TABLET ORAL at 08:56

## 2017-01-01 RX ADMIN — MIDAZOLAM HYDROCHLORIDE 1 MG: 1 INJECTION, SOLUTION INTRAMUSCULAR; INTRAVENOUS at 10:12

## 2017-01-01 RX ADMIN — LEVOTHYROXINE SODIUM 25 MCG: 25 TABLET ORAL at 08:56

## 2017-01-01 RX ADMIN — GABAPENTIN 300 MG: 300 CAPSULE ORAL at 21:24

## 2017-01-01 RX ADMIN — ACETAMINOPHEN 975 MG: 325 TABLET, FILM COATED ORAL at 04:16

## 2017-01-01 RX ADMIN — FENTANYL CITRATE 25 MCG: 50 INJECTION, SOLUTION INTRAMUSCULAR; INTRAVENOUS at 10:15

## 2017-01-01 RX ADMIN — Medication 5 MG: at 12:16

## 2017-01-01 RX ADMIN — BUPIVACAINE HYDROCHLORIDE IN DEXTROSE 12 MG: 7.5 INJECTION, SOLUTION SUBARACHNOID at 12:06

## 2017-01-01 RX ADMIN — MICONAZOLE NITRATE: 2 POWDER TOPICAL at 08:22

## 2017-01-01 RX ADMIN — PHENYLEPHRINE HYDROCHLORIDE 50 MCG: 10 INJECTION, SOLUTION INTRAMUSCULAR; INTRAVENOUS; SUBCUTANEOUS at 12:24

## 2017-01-01 RX ADMIN — LISINOPRIL 5 MG: 5 TABLET ORAL at 08:22

## 2017-01-01 RX ADMIN — TAMSULOSIN HYDROCHLORIDE 0.4 MG: 0.4 CAPSULE ORAL at 21:24

## 2017-01-01 RX ADMIN — OXYCODONE HYDROCHLORIDE 2.5 MG: 5 TABLET ORAL at 18:40

## 2017-01-01 RX ADMIN — ACETAMINOPHEN 975 MG: 325 TABLET, FILM COATED ORAL at 17:28

## 2017-01-01 RX ADMIN — TRANEXAMIC ACID 1 G: 100 INJECTION, SOLUTION INTRAVENOUS at 14:57

## 2017-01-01 RX ADMIN — LEVOTHYROXINE SODIUM 25 MCG: 25 TABLET ORAL at 07:06

## 2017-01-01 RX ADMIN — IOPAMIDOL 9 ML: 408 INJECTION, SOLUTION INTRATHECAL at 08:56

## 2017-01-01 RX ADMIN — ONDANSETRON 4 MG: 2 SOLUTION INTRAMUSCULAR; INTRAVENOUS at 02:36

## 2017-01-01 RX ADMIN — ENOXAPARIN SODIUM 40 MG: 40 INJECTION SUBCUTANEOUS at 16:20

## 2017-01-01 RX ADMIN — ACETAMINOPHEN 650 MG: 325 TABLET, FILM COATED ORAL at 02:52

## 2017-01-01 RX ADMIN — CEFAZOLIN SODIUM 2 G: 2 INJECTION, SOLUTION INTRAVENOUS at 21:52

## 2017-01-01 RX ADMIN — METFORMIN HYDROCHLORIDE 500 MG: 500 TABLET, EXTENDED RELEASE ORAL at 21:24

## 2017-01-01 RX ADMIN — TAMSULOSIN HYDROCHLORIDE 0.4 MG: 0.4 CAPSULE ORAL at 20:45

## 2017-01-01 RX ADMIN — PHENYLEPHRINE HYDROCHLORIDE 50 MCG: 10 INJECTION, SOLUTION INTRAMUSCULAR; INTRAVENOUS; SUBCUTANEOUS at 12:28

## 2017-01-01 RX ADMIN — LEVOTHYROXINE SODIUM 25 MCG: 25 TABLET ORAL at 08:03

## 2017-01-01 RX ADMIN — ACETAMINOPHEN 975 MG: 325 TABLET, FILM COATED ORAL at 10:06

## 2017-01-01 RX ADMIN — MIDAZOLAM HYDROCHLORIDE 1 MG: 1 INJECTION, SOLUTION INTRAMUSCULAR; INTRAVENOUS at 12:03

## 2017-01-01 RX ADMIN — FENTANYL CITRATE 50 MCG: 50 INJECTION, SOLUTION INTRAMUSCULAR; INTRAVENOUS at 11:51

## 2017-01-01 RX ADMIN — SENNOSIDES AND DOCUSATE SODIUM 1 TABLET: 8.6; 5 TABLET ORAL at 20:45

## 2017-01-01 RX ADMIN — POTASSIUM CHLORIDE, DEXTROSE MONOHYDRATE AND SODIUM CHLORIDE: 150; 5; 450 INJECTION, SOLUTION INTRAVENOUS at 20:13

## 2017-01-01 RX ADMIN — SODIUM CHLORIDE, POTASSIUM CHLORIDE, SODIUM LACTATE AND CALCIUM CHLORIDE: 600; 310; 30; 20 INJECTION, SOLUTION INTRAVENOUS at 14:55

## 2017-01-01 RX ADMIN — ACETAMINOPHEN 975 MG: 325 TABLET, FILM COATED ORAL at 12:55

## 2017-01-01 RX ADMIN — GABAPENTIN 300 MG: 300 CAPSULE ORAL at 21:31

## 2017-01-01 RX ADMIN — ACETAMINOPHEN 650 MG: 325 TABLET, FILM COATED ORAL at 13:48

## 2017-01-01 RX ADMIN — METFORMIN HYDROCHLORIDE 500 MG: 500 TABLET, EXTENDED RELEASE ORAL at 21:33

## 2017-01-01 RX ADMIN — LISINOPRIL 5 MG: 5 TABLET ORAL at 08:37

## 2017-01-01 RX ADMIN — DEXAMETHASONE SODIUM PHOSPHATE 20 MG: 10 INJECTION, SOLUTION INTRAMUSCULAR; INTRAVENOUS at 09:02

## 2017-01-01 RX ADMIN — KETOROLAC TROMETHAMINE 15 MG: 15 INJECTION, SOLUTION INTRAMUSCULAR; INTRAVENOUS at 03:10

## 2017-01-01 RX ADMIN — OXYCODONE HYDROCHLORIDE 2.5 MG: 5 TABLET ORAL at 03:10

## 2017-01-01 RX ADMIN — ACETAMINOPHEN 650 MG: 325 TABLET, FILM COATED ORAL at 08:03

## 2017-01-01 RX ADMIN — ENOXAPARIN SODIUM 40 MG: 40 INJECTION SUBCUTANEOUS at 13:36

## 2017-01-01 RX ADMIN — METFORMIN HYDROCHLORIDE 500 MG: 500 TABLET, EXTENDED RELEASE ORAL at 20:45

## 2017-01-01 RX ADMIN — LATANOPROST 1 DROP: 50 SOLUTION/ DROPS OPHTHALMIC at 21:27

## 2017-01-01 ASSESSMENT — PAIN DESCRIPTION - DESCRIPTORS
DESCRIPTORS: ACHING
DESCRIPTORS: ACHING

## 2017-01-01 ASSESSMENT — COPD QUESTIONNAIRES: COPD: 1

## 2017-01-01 ASSESSMENT — MIFFLIN-ST. JEOR: SCORE: 1805.24

## 2017-01-01 ASSESSMENT — LIFESTYLE VARIABLES: TOBACCO_USE: 1

## 2017-06-08 NOTE — IP AVS SNAPSHOT
[place] [address]    [place] [address]    Phone:  [phone]                                       After Visit Summary   6/8/2017    Chris Villalobos    MRN: [id number]           After Visit Summary Signature Page     I have received my discharge instructions, and my questions have been answered. I have discussed any challenges I see with this plan with the nurse or doctor.    ..........................................................................................................................................  Patient/Patient Representative Signature      ..........................................................................................................................................  Patient Representative Print Name and Relationship to Patient    ..................................................               ................................................  Date                                            Time    ..........................................................................................................................................  Reviewed by Signature/Title    ...................................................              ..............................................  Date                                                            Time

## 2017-06-08 NOTE — IP AVS SNAPSHOT
MRN:[id number]                      After Visit Summary   6/8/2017    Chris Villalobos    MRN: [id number]           Visit Information        Department      6/8/2017  6:51 AM [facility]          Review of your medicines      UNREVIEWED medicines. Ask your doctor about these medicines        Dose / Directions    GABAPENTIN PO        Dose:  300 mg   Take 300 mg by mouth daily   Refills:  0       IBUPROFEN PO        Dose:  400 mg   Take 400 mg by mouth every 6 hours as needed for moderate pain   Refills:  0       LIPITOR PO        Dose:  10 mg   Take 10 mg by mouth daily   Refills:  0       LISINOPRIL PO        Dose:  10 mg   Take 10 mg by mouth daily   Refills:  0       METOPROLOL SUCCINATE ER PO        Dose:  50 mg   Take 50 mg by mouth daily   Refills:  0                Protect others around you: Learn how to safely use, store and throw away your medicines at www.disposemymeds.org.         Follow-ups after your visit        Your next 10 appointments already scheduled     Jun 08, 2017  8:00 AM CDT   XR LUMBAR EPIDURAL INJECTION with GENNA, [facility] NURSE,  [facility]   [facility] ([facility])    [address] [place] [id number] [phone]           For nerve root injection, please send or bring copies of any MRIs or other scans you have had.  Bring a list of your current medicines to your exam. (Include vitamins, minerals and over-the-counter medicines.) Leave your valuables at home.  Plan to have someone drive you home afterward.  Stop taking the following medicines (but talk to your doctor first):   If you take blood thinners, you may need to stop taking them a few days before treatment. Talk to your doctor before stopping these medicines.Stop taking Coumadin (warfarin) 3 days before treatment. Restart the day after treatment.   If you take Plavix, Ticlid, Pletal or Persantine, please ask your doctor if you should stop these  medicines. You may need extra tests on the morning of your scan. You may take your other medicines as normal.  Stop all food and drink (including water) 3 hours before your test or treatment.  Please tell the doctor:   If you are allergic to X-ray dye (contrast fluid).   If you may be pregnant.  Injections take about 30 to 45 minutes. Most people spend up to 2 hours in the clinic or hospital.  Please call the Imaging Department at your exam site with any questions               Care Instructions        Further instructions from your care team       Steroid Injection Discharge Instructions     After you go home:      You may resume your normal diet.    Care of Puncture Site:      If you have a bandaid on your puncture site, you may remove it the next morning    You may shower tomorrow    No bath tubs, whirlpools or swimming for at least 3 days     Activity:      You may go back to normal activity in 24 hours    You should let pain be your guide as to the extent of your activities    Maintain any activity limitations as ordered by your provider    Do NOT drive a vehicle until tomorrow    Medicines:      You may resume all medications    Resume your Warfarin/Coumadin at your regular dose today. Follow up with your provider to have your INR rechecked    Resume your Platelet Inhibitors and Aspirin tomorrow at your regular dose    For minor pain, you may take Acetaminophen (Tylenol) or Ibuprofen (Advil)    Pain:       You may experience increased or different pain over the next 24-48 hours    For the next 48 hrs - you may use ice packs for discomfort     Call your primary care doctor if:      You have severe pain that does not improve with pain medication    You have chills or a fever greater than 101 F (38 C)    The site is red, swollen, hot or tender    New problems with your bowel or bladder    Any questions or concerns    Other Instructions:      New numbness down your leg post injection is temporary and may last for  "up to 6 hours. You may need assistance with activity until your leg has normal sensation.    If you are diabetic, monitor your blood sugar closely. Contact the provider who manages your diabetes to help you control your blood sugar if needed.    For Your Information:      A steroid was injected to help decrease swelling and may help to reduce pain. It may take up to 7-10 days to obtain full results.    Some patients will get lasting relief from a single injection. Others may require up to 3 injections to get results. If you have more than one steroid injection, they should be given 2 weeks apart.    Side effects of your steroid injection are mild and will go away in 2-3 days  - Insomnia  - Heartburn  - Flushed face  - Water retention  - Increased appetite  - Increased blood sugar      If you have questions call:        [facility] Dept @ [phone]      The provider who performed your procedure was ____Dr. Rosales_____________.         Additional Information About Your Visit        [employer] Information     [employer] lets you send messages to your doctor, view your test results, renew your prescriptions, schedule appointments and more. To sign up, go to [email or web address] . Click on \"Log in\" on the left side of the screen, which will take you to the Welcome page. Then click on \"Sign up Now\" on the right side of the page.     You will be asked to enter the access code listed below, as well as some personal information. Please follow the directions to create your username and password.     Your access code is: [email or web address]  Expires: 2017  7:43 AM     Your access code will  in 90 days. If you need help or a new code, please call your [place] [facility] clinic or [phone].        Care EveryWhere ID     This is your Care EveryWhere ID. This could be used by other organizations to access your [facility] medical records  [id number]        Your Vitals Were     Blood Pressure Pulse Temperature " "Respirations Height Weight    156/84 (BP Location: Left arm) 62 96  F (35.6  C) (Oral) 18 1.93 m (6' 4\") 102 kg (224 lb 14.4 oz)    Pulse Oximetry BMI (Body Mass Index)                93% 27.38 kg/m2           Primary Care Provider    Referred MD Garrett      Thank you!     Thank you for choosing [facility] for your care. Our goal is always to provide you with excellent care. Hearing back from our patients is one way we can continue to improve our services. Please take a few minutes to complete the written survey that you may receive in the mail after you visit with us. Thank you!             Medication List: This is a list of all your medications and when to take them. Check marks below indicate your daily home schedule. Keep this list as a reference.      Medications           Morning Afternoon Evening Bedtime As Needed    GABAPENTIN PO   Take 300 mg by mouth daily                                IBUPROFEN PO   Take 400 mg by mouth every 6 hours as needed for moderate pain                                LIPITOR PO   Take 10 mg by mouth daily                                LISINOPRIL PO   Take 10 mg by mouth daily                                METOPROLOL SUCCINATE ER PO   Take 50 mg by mouth daily                                  "

## 2017-06-08 NOTE — PROGRESS NOTES
Patient verbalizes understanding of discharge instructions and follow up.  No further questions at this time.

## 2017-06-08 NOTE — PROGRESS NOTES
RADIOLOGY PROCEDURE NOTE  Patient name: Chris Villalobos  MRN: [id number]  : 1932    Pre-procedure diagnosis: Low back pain  Post-procedure diagnosis: Same    Procedure Date/Time: 2017  9:14 AM  Procedure: Caudal ARELY  Estimated blood loss: None  Specimen(s) collected with description: none  The patient tolerated the procedure well with no immediate complications.  Significant findings:none    See imaging dictation for procedural details.    Provider name: Kristopher Tariq  Assistant(s):None

## 2017-06-08 NOTE — DISCHARGE INSTRUCTIONS
Steroid Injection Discharge Instructions     After you go home:      You may resume your normal diet.    Care of Puncture Site:      If you have a bandaid on your puncture site, you may remove it the next morning    You may shower tomorrow    No bath tubs, whirlpools or swimming for at least 3 days     Activity:      You may go back to normal activity in 24 hours    You should let pain be your guide as to the extent of your activities    Maintain any activity limitations as ordered by your provider    Do NOT drive a vehicle until tomorrow    Medicines:      You may resume all medications    Resume your Warfarin/Coumadin at your regular dose today. Follow up with your provider to have your INR rechecked    Resume your Platelet Inhibitors and Aspirin tomorrow at your regular dose    For minor pain, you may take Acetaminophen (Tylenol) or Ibuprofen (Advil)    Pain:       You may experience increased or different pain over the next 24-48 hours    For the next 48 hrs - you may use ice packs for discomfort     Call your primary care doctor if:      You have severe pain that does not improve with pain medication    You have chills or a fever greater than 101 F (38 C)    The site is red, swollen, hot or tender    New problems with your bowel or bladder    Any questions or concerns    Other Instructions:      New numbness down your leg post injection is temporary and may last for up to 6 hours. You may need assistance with activity until your leg has normal sensation.    If you are diabetic, monitor your blood sugar closely. Contact the provider who manages your diabetes to help you control your blood sugar if needed.    For Your Information:      A steroid was injected to help decrease swelling and may help to reduce pain. It may take up to 7-10 days to obtain full results.    Some patients will get lasting relief from a single injection. Others may require up to 3 injections to get results. If you have more than one  steroid injection, they should be given 2 weeks apart.    Side effects of your steroid injection are mild and will go away in 2-3 days  - Insomnia  - Heartburn  - Flushed face  - Water retention  - Increased appetite  - Increased blood sugar      If you have questions call:        [facility] Radiology Dept @ [phone]      The provider who performed your procedure was ____Dr. Rosales_____________.

## 2017-06-08 NOTE — PROGRESS NOTES
Patient steady on feet.  Ambulating without difficulty.  Son went to get car to pull up at .  Patient brought out in a wheelchair.

## 2017-09-07 PROBLEM — Z96.651 STATUS POST TOTAL KNEE REPLACEMENT, RIGHT: Status: ACTIVE | Noted: 2017-01-01

## 2017-09-07 NOTE — ANESTHESIA POSTPROCEDURE EVALUATION
Patient: Chris Villalobos    Procedure(s):  RIGHT TOTAL KNEE ARTHOPLASTY  - Wound Class: I-Clean    Diagnosis:right knee djd  Diagnosis Additional Information: No value filed.    Anesthesia Type:  Spinal, Periph. Nerve Block for postop pain    Note:  Anesthesia Post Evaluation    Patient location during evaluation: PACU  Patient participation: Able to fully participate in evaluation  Level of consciousness: awake  Pain management: adequate  Airway patency: patent  Cardiovascular status: acceptable  Respiratory status: acceptable  Hydration status: acceptable  PONV: none     Anesthetic complications: None          Last vitals:  Vitals:    09/07/17 1630 09/07/17 1640 09/07/17 1650   BP: 142/90 144/82 132/87   Resp: 9 8 10   Temp:      SpO2: 97% 96% 97%         Electronically Signed By: Felicity Gray MD  September 7, 2017  4:58 PM

## 2017-09-07 NOTE — PROGRESS NOTES
Consult dictated.    1.  S/p right TKA 9/7/17  2.  Ischemic CM.  No chest pain.  History of freq PVC's  - cont. Tele  - Cont. ACE-I (with hold parameters)  - not on statin, ASA or b-blocker for unclear reasons (son will follow up with cardiology)    2.  History of post op urinary retention.  Flomax started for cynthia-op.  - cont. flomax until voids after watson removed.  According to son, its temporary med to prevent post op urinary retention.  - avoid Benadryl  3.  Glaucoma  - cont eye drops  4.  Hypothyroidism  - cont. Synthroid  5.  AAA, stable  3.6  - cont. Op follow up  6.  Diabetes mellitus, type II  - aic 5.8  - resume metformin 9/8/17 (listed as allergy but taking without problems thus removed from list)  7.  Post op confusion (per patient report)  - avoid benadryl, limit narcotics, monitor.  Fall precautions  8.  COPD, not on meds  9.  DVT proph  - Lovenox ordered    Full code  Natasha Cruz PA-C  #[id number]

## 2017-09-07 NOTE — ANESTHESIA CARE TRANSFER NOTE
Patient: Chris Villalobos    Procedure(s):  RIGHT TOTAL KNEE ARTHOPLASTY  - Wound Class: I-Clean    Diagnosis: right knee djd  Diagnosis Additional Information: No value filed.    Anesthesia Type:   Spinal, Periph. Nerve Block for postop pain     Note:  Airway :Face Mask  Patient transferred to:PACU  Comments: Pt to PACU, spont resp, alert/awake, following commands, vss. Report to RN      Vitals: (Last set prior to Anesthesia Care Transfer)    CRNA VITALS  9/7/2017 1456 - 9/7/2017 1535      9/7/2017             SpO2: 100 %    Resp Rate (set): 10                Electronically Signed By: LALIT Horvath CRNA  September 7, 2017  3:35 PM

## 2017-09-07 NOTE — IP AVS SNAPSHOT
[address] [facility]    [address] [place] [address]    Phone:  [phone]                                       After Visit Summary   9/7/2017    Chris Villalobos    MRN: [id number]           After Visit Summary Signature Page     I have received my discharge instructions, and my questions have been answered. I have discussed any challenges I see with this plan with the nurse or doctor.    ..........................................................................................................................................  Patient/Patient Representative Signature      ..........................................................................................................................................  Patient Representative Print Name and Relationship to Patient    ..................................................               ................................................  Date                                            Time    ..........................................................................................................................................  Reviewed by Signature/Title    ...................................................              ..............................................  Date                                                            Time

## 2017-09-07 NOTE — IP AVS SNAPSHOT
MRN:[id number]                      After Visit Summary   9/7/2017    Chrsi Villalobos    MRN: [id number]           Thank you!     Thank you for choosing [facility] for your care. Our goal is always to provide you with excellent care. Hearing back from our patients is one way we can continue to improve our services. Please take a few minutes to complete the written survey that you may receive in the mail after you visit with us. Thank you!        Patient Information     Date Of Birth          1/20/1932        Designated Caregiver       Most Recent Value    Caregiver    Will someone help with your care after discharge? no      About your hospital stay     You were admitted on:  September 7, 2017 You last received care in the:  [place] [address] [facility]    You were discharged on:  September 11, 2017        Reason for your hospital stay       Total knee replacement                  Who to Call     For medical emergencies, please call 911.  For non-urgent questions about your medical care, please call your primary care provider or clinic, None  For questions related to your surgery, please call your surgery clinic        Attending Provider     Provider Specialty    Leonel Thrasher MD Orthopedics       Primary Care Provider    Referred [facility], [facility]      After Care Instructions     Activity       Your activity upon discharge: activity as tolerated.  You should work on home exercises provided by the physical therapist at the hospital 2-3 times a day.     Physical Therapy: After leaving the hospital you should have appointments to see the physical therapist 2-3 times a week.  You should also work on your home exercises 2-3 times a day.  Riding a stationary bike, rocking the pedals back and forth, is the best exercise you can do after a knee replacement.     Assistive Ambulatory Devices  Use your walker/crutches until you are advanced to a cane with the assistance of the physical therapist.  You  may discontinue the use of the cane when you feel comfortable.     Elevate: Keeping your leg elevated with a pillow under your calf, not under the knee, will help with the swelling.  The best position is to have the knee above the level of your heart and your ankle above the level of your knee. Wearing the compression stockings (Oliver hose) can help reduce swelling.  You should wear these until you are seen at Dr. Pederson's office post operatively.  You can remove these twice a day for laundering and hygiene.  The swelling in the leg will be present for at least 8 weeks.     Ice: Ice the knee after physical therapy and 4-5 times a day for 20-30 minutes at a time.  Icing will help reduce swelling and pain.     Pain control: Take the pain medication and/or anti-inflammatory medication as prescribed.  Don't let your pain become severe.            Diet       Follow this diet upon discharge: Regular            Discharge Instructions       Upon leaving the hospital you will be discharged with a few different medications:     1. Aspirin 325mg - you will be taking one tablet twice a day for 4 weeks following surgery for a blood thinner to help prevent blood clots.     2. Oxycodone 2.5mg - this is a pain medication.  You can take 1/2 or one tablet every four to six hours.  You will need this medication the longest to sleep at night and for physical therapy.  You can wean yourself from this medication as you are able by either increasing the time between tablets or going down to one tablet if you are taking two at a time.    **REMINDER: If you need a refill of your pain medication prior to your first post-operative appointment please contact our office and allow 24 to 48 hours for refills to be processed. Any refills needed before the weekend will need to be submitted on Thursday.  Also, all narcotic pain medication cannot be called in to the pharmacy and will need to be picked up at one of our clinics ([place] or [place]),  this is a Federal Law.  You or a family member will need to allow for time to come to our office to pick these up.  Please let us know who will be picking up the prescription as that person will need to show a photo ID to get the prescription.**    3. Senokot - this is a stool softener.  You can take one or two tablets twice a day as needed for constipation.  You should take this medication as long as you are taking the narcotic pain medication and as long as you do not have diarrhea.  As you are more active and taking less pain medication you will be able to stop using this.     Other medications not prescribed:   1. Tylenol - you can take Tylenol in addition to the pain medication.  Do not exceed 3,000mg in a day.   2. Ibuprofen - we would like you to avoid taking ibuprofen while you are taking the aspirin.   3. Iron - we typically do not prescribe an iron supplement pill after surgery however, if you want to take one we recommend 324 or 325mg daily.  These pills will make you more constipated.     Please contact Dr. Pederson's office with any questions.  You can either call Dr. Dacia Hawkins's , at [phone] or email Dr. Dacia Abbott's physician assistant, at [email or web address].            Wound care and dressings       You have a gauze and tape dressing over the incision that you should change daily for one week.  Once you are one week out from surgery you do not need to keep a dressing over the incision.  There is a thin mesh film adhered to your skin over the incision which should stay in place until your follow-up appointment with Dr. Pederson.  If this comes off you should call Dr. Pederson's office for further instruction.  You can get your incision wet in the shower but you should not submerge it.                  Follow-up Appointments     Follow-up and recommended labs and tests       Your follow-up appointment is schedule for 9:00am on Friday 9/22 at the [place] office with   "Dacia.  Please call [phone] if you need to reschedule this appointment.    If you have any questions or concerns you can call Dr. Pederson's , Shruthi, at [phone] or email Dr. Dacia Abbott's physician assistant at [email or web address] (?????)                  Pending Results     Date and Time Order Name Status Description    2017 0921 EKG 12-lead, tracing only Preliminary             Statement of Approval     Ordered          17 1630  I have reviewed and agree with all the recommendations and orders detailed in this document.  EFFECTIVE NOW     Approved and electronically signed by:  Leonle Thrasher MD           17 1031  I have reviewed and agree with all the recommendations and orders detailed in this document.  EFFECTIVE NOW     Approved and electronically signed by:  Milena Feliciano PA-C             Admission Information     Date & Time Provider Department Dept. Phone    2017 Leonel Thrasher MD [place] [address] [facility] [phone]      Your Vitals Were     Blood Pressure Temperature Respirations Height Weight Pulse Oximetry    130/60 (BP Location: Right arm) 97.6  F (36.4  C) (Oral) 16 1.93 m (6' 4\") 98.8 kg (217 lb 14.4 oz) 95%    BMI (Body Mass Index)                   26.52 kg/m2           [employer] Information     [employer] lets you send messages to your doctor, view your test results, renew your prescriptions, schedule appointments and more. To sign up, go to [email or web address] . Click on \"Log in\" on the left side of the screen, which will take you to the Welcome page. Then click on \"Sign up Now\" on the right side of the page.     You will be asked to enter the access code listed below, as well as some personal information. Please follow the directions to create your username and password.     Your access code is: [email or web address]  Expires: 2017  1:33 PM     Your access code will  in 90 days. If you need help or a new code, please " call your [facility] clinic or [phone].        Care EveryWhere ID     This is your Care EveryWhere ID. This could be used by other organizations to access your [facility] medical records  [id number]        Equal Access to Services     TORRES HUERTA : Hadii aad ku hadsaulangel Jose, waaxda luqadaha, qaybta kaalmada adefilibertoyada, gwendolyn mariamin hayaaluz flor miriamleydi wilhelm. So [facility] [phone].    ATENCIÓN: Si habla español, tiene a palomo disposición servicios gratuitos de asistencia lingüística. Llame al [phone].    We comply with applicable federal civil rights laws and [place] laws. We do not discriminate on the basis of race, color, national origin, age, disability sex, sexual orientation or gender identity.               Review of your medicines      START taking        Dose / Directions    aspirin  MG EC tablet        Dose:  325 mg   Take 1 tablet (325 mg) by mouth 2 times daily   Quantity:  60 tablet   Refills:  0       oxyCODONE 5 MG IR tablet   Commonly known as:  ROXICODONE        Dose:  2.5-5 mg   Take 0.5-1 tablets (2.5-5 mg) by mouth every 4 hours as needed for moderate to severe pain   Quantity:  40 tablet   Refills:  0       senna-docusate 8.6-50 MG per tablet   Commonly known as:  SENOKOT-S;PERICOLACE        Dose:  1-2 tablet   Take 1-2 tablets by mouth 2 times daily as needed for constipation   Quantity:  60 tablet   Refills:  0         CONTINUE these medicines which have NOT CHANGED        Dose / Directions    BUSPAR PO        Dose:  10 mg   Take 10 mg by mouth 2 times daily as needed   Refills:  0       diphenhydrAMINE-acetaminophen  MG tablet   Commonly known as:  TYLENOL PM        Dose:  2 tablet   Take 2 tablets by mouth At Bedtime   Refills:  0       FLOMAX 0.4 MG capsule   Generic drug:  tamsulosin        Dose:  0.4 mg   Take 0.4 mg by mouth At Bedtime   Refills:  0       GABAPENTIN PO        Dose:  300 mg   Take 300 mg by mouth At Bedtime   Refills:  0       GLUCOPHAGE XR PO         Dose:  500 mg   Take 500 mg by mouth At Bedtime   Refills:  0       latanoprost 0.005 % ophthalmic solution   Commonly known as:  XALATAN        Dose:  1 drop   Place 1 drop into both eyes At Bedtime   Refills:  0       LEVOTHYROXINE SODIUM PO        Dose:  25 mcg   Take 25 mcg by mouth daily   Refills:  0       LISINOPRIL PO        Dose:  5 mg   Take 5 mg by mouth daily   Refills:  0       NITROSTAT SL        Dose:  0.4 mg   Place 0.4 mg under the tongue every 5 minutes as needed for chest pain   Refills:  0       TYLENOL PO        Dose:  1000 mg   Take 1,000 mg by mouth daily (2 x 500mg = 1000mg dose)   Refills:  0            Where to get your medicines      These medications were sent to [facility] - [address]  [address], [place] [address]     Phone:  [phone]     aspirin  MG EC tablet    senna-docusate 8.6-50 MG per tablet         Some of these will need a paper prescription and others can be bought over the counter. Ask your nurse if you have questions.     Bring a paper prescription for each of these medications     oxyCODONE 5 MG IR tablet                Protect others around you: Learn how to safely use, store and throw away your medicines at www.disposemymeds.org.             Medication List: This is a list of all your medications and when to take them. Check marks below indicate your daily home schedule. Keep this list as a reference.      Medications           Morning Afternoon Evening Bedtime As Needed    aspirin  MG EC tablet   Take 1 tablet (325 mg) by mouth 2 times daily   Next Dose Due:  9/11/17                                      BUSPAR PO   Take 10 mg by mouth 2 times daily as needed   Next Dose Due:  Resume as directed                                   diphenhydrAMINE-acetaminophen  MG tablet   Commonly known as:  TYLENOL PM   Take 2 tablets by mouth At Bedtime   Next Dose Due:  Resume as directed                                    FLOMAX 0.4 MG capsule   Take 0.4 mg by mouth At Bedtime   Last time this was given:  0.4 mg on 9/10/2017  8:45 PM   Generic drug:  tamsulosin   Next Dose Due:  9/11/17                                   GABAPENTIN PO   Take 300 mg by mouth At Bedtime   Last time this was given:  300 mg on 9/10/2017  8:45 PM   Next Dose Due:  9/11/17                                   GLUCOPHAGE XR PO   Take 500 mg by mouth At Bedtime   Last time this was given:  500 mg on 9/10/2017  8:45 PM   Next Dose Due:  9/11/17                                   latanoprost 0.005 % ophthalmic solution   Commonly known as:  XALATAN   Place 1 drop into both eyes At Bedtime   Last time this was given:  1 drop on 9/10/2017  8:46 PM   Next Dose Due:  9/11/17                                   LEVOTHYROXINE SODIUM PO   Take 25 mcg by mouth daily   Last time this was given:  25 mcg on 9/11/2017  8:03 AM   Next Dose Due:  9/12/17                                   LISINOPRIL PO   Take 5 mg by mouth daily   Last time this was given:  5 mg on 9/11/2017  8:03 AM   Next Dose Due:  9/12/17                                   NITROSTAT SL   Place 0.4 mg under the tongue every 5 minutes as needed for chest pain   Next Dose Due:  Resume as directed                                   oxyCODONE 5 MG IR tablet   Commonly known as:  ROXICODONE   Take 0.5-1 tablets (2.5-5 mg) by mouth every 4 hours as needed for moderate to severe pain   Last time this was given:  2.5 mg on 9/11/2017  2:52 AM                                   senna-docusate 8.6-50 MG per tablet   Commonly known as:  SENOKOT-S;PERICOLACE   Take 1-2 tablets by mouth 2 times daily as needed for constipation   Last time this was given:  2 tablets on 9/11/2017  8:03 AM   Next Dose Due:  Resume as directed                                   TYLENOL PO   Take 1,000 mg by mouth daily (2 x 500mg = 1000mg dose)   Last time this was given:  650 mg on 9/11/2017  1:48 PM   Next Dose Due:  Resume as directed

## 2017-09-07 NOTE — PROGRESS NOTES
Admission medication history interview status for the 9/7/2017  admission is complete. See EPIC admission navigator for prior to admission medications     Medication history source reliability:Good    Medication history interview source(s): Patient's son     Medication history resources (including written lists, pill bottles, clinic record): Patient's son brought in pictures of the bottle from phone and written lists.    Primary pharmacy[facility]    Additional medication history information not noted on PTA med list :None    Time spent in this activity: 40 minutes    Prior to Admission medications    Medication Sig Last Dose Taking? Auth Provider   MetFORMIN HCl (GLUCOPHAGE XR PO) Take 500 mg by mouth At Bedtime 9/6/2017 at PM Yes Reported, Patient   BusPIRone HCl (BUSPAR PO) Take 10 mg by mouth daily 9/7/2017 at 0715 Yes Reported, Patient   Nitroglycerin (NITROSTAT SL) Place 0.4 mg under the tongue every 5 minutes as needed for chest pain never needed at PRN Yes Reported, Patient   tamsulosin (FLOMAX) 0.4 MG capsule Take 0.4 mg by mouth At Bedtime 9/6/2017 at PM Yes Reported, Patient   LEVOTHYROXINE SODIUM PO Take 25 mcg by mouth daily 9/7/2017 at 0715 Yes Reported, Patient   Acetaminophen (TYLENOL PO) Take 1,000 mg by mouth daily (2 x 500mg = 1000mg dose) 9/7/2017 at 0715 Yes Reported, Patient   diphenhydrAMINE-acetaminophen (TYLENOL PM)  MG tablet Take 2 tablets by mouth At Bedtime 9/6/2017 at PM Yes Reported, Patient   travoprost, BAK Free, (TRAVATAN Z) 0.004 % ophthalmic solution Place 1 drop into both eyes At Bedtime 9/6/2017 at PM Yes Reported, Patient   LISINOPRIL PO Take 5 mg by mouth daily  9/7/2017 at 0715 Yes Reported, Patient   GABAPENTIN PO Take 300 mg by mouth At Bedtime  9/6/2017 at PM Yes Reported, Patient

## 2017-09-07 NOTE — ANESTHESIA PREPROCEDURE EVALUATION
Anesthesia Evaluation     . Pt has had prior anesthetic.     No history of anesthetic complications          ROS/MED HX    ENT/Pulmonary:     (+)tobacco use, COPD, , . .   (-) sleep apnea   Neurologic:     (+)neuropathy     Cardiovascular: Comment: BMS RCA 2009 2016 cath - patent RCA, minimal LAD disease  Frequent PVCs  AAA infrarenal 3.6 cm  2011 ef 65% fixed defect  8/2017 ef 45%    (+) Dyslipidemia, hypertension-Peripheral Vascular Disease-CAD, -past MI,-stent,. : . . . :. Irregular Heartbeat/Palpitations, .      (-) angina and angina   METS/Exercise Tolerance:  3 - Able to walk 1-2 blocks without stopping   Hematologic:         Musculoskeletal:         GI/Hepatic:     (+) GERD Asymptomatic on medication,       Renal/Genitourinary:         Endo:     (+) type I DM, Not using insulin Obesity, .      Psychiatric:         Infectious Disease:         Malignancy:   (+) Malignancy History of GI, Prostate and Other  Prostate CA Remission status post, GI CA Remission status post, Other CA bladder Remission status post         Other:                     Physical Exam      Airway   Mallampati: II  TM distance: >3 FB  Neck ROM: full    Dental   (+) upper dentures and lower dentures    Cardiovascular   Rhythm and rate: irregular and normal      Pulmonary (+) decreased breath sounds     PE comment: Mildly decreased BS                    Anesthesia Plan      History & Physical Review  History and physical reviewed and following examination; no interval change.    ASA Status:  3 .    NPO Status:  > 8 hours    Plan for Spinal and Periph. Nerve Block for postop pain   PONV prophylaxis:  Ondansetron (or other 5HT-3)  Minimize versed  precedex  Phenylephrine drip      Postoperative Care      Consents  Anesthetic plan, risks, benefits and alternatives discussed with:  Patient..                          .

## 2017-09-07 NOTE — ANESTHESIA PROCEDURE NOTES
Peripheral nerve/Neuraxial procedure note : intrathecal  Pre-Procedure  Performed by RAFIQ BARNES  Location: OR      Pre-Anesthestic Checklist: patient identified, IV checked, risks and benefits discussed, informed consent, monitors and equipment checked and pre-op evaluation    Timeout  Correct Patient: Yes   Correct Procedure: Yes   Correct Site: Yes   Correct Laterality: N/A   Correct Position: Yes   Site Marked: N/A   .   Procedure Documentation    .    Procedure:    Intrathecal.  Insertion Site:L3-4  (right paramedian approach)      Patient Prep;povidone-iodine 7.5% surgical scrub.  .  Needle: Dang-Haseeb Spinal Needle (gauge): 22  Spinal/LP Needle Length (inches): 3.5 # of attempts: 1 and # of redirects:  .       Assessment/Narrative  Paresthesias: No.  .  .  clear CSF fluid removed . Comments:  Subarachnoid Block  1.6 ml 0.75% bupivacaine with dextrose and 100 mcg epinephrine

## 2017-09-07 NOTE — ANESTHESIA PROCEDURE NOTES
Peripheral nerve/Neuraxial procedure note : femoral  Pre-Procedure  Performed by RAFIQ BARNES  Referred by SAMREEN  Location: pre-op      Pre-Anesthestic Checklist: patient identified, IV checked, site marked, risks and benefits discussed, informed consent, monitors and equipment checked, at physician/surgeon's request and post-op pain management    Timeout  Correct Patient: Yes   Correct Procedure: Yes   Correct Site: Yes   Correct Laterality: Yes   Correct Position: Yes   Site Marked: Yes   .   Procedure Documentation    .    Procedure:    Femoral.  Local skin infiltrated with 3 mL of 1% lidocaine.     Ultrasound used to identify targeted nerve, plexus, or vascular marker and placed a needle adjacent to it., Ultrasound was used to visualize the spread of the anesthetic in close proximity to the above stated nerve. A permanent image is entered into the patient's record.  Patient Prep;mask, sterile gloves, chlorhexidine gluconate and isopropyl alcohol, patient draped.  Nerve Stim: Initial Level 1 mA. Lowest motor response mA..  Needle: insulated, short bevel Needle Gauge: 20.    Needle Length (Inches) 2  .       Assessment/Narrative  Paresthesias: No.  .  The placement was negative for: blood aspirated, painful injection and site bleeding.  Bolus given via needle..   Secured via.   Complications: none. Comments:  Single shot femoral nerve block (adductor canal)  20 ml 0.5% Ropivacaine with 1:400,000 Epinephrine

## 2017-09-08 NOTE — PROGRESS NOTES
"Orthopedic Surgery  9/8/2017  POD #1    S: Patient voices no complaints today.     O: Blood pressure 121/57, temperature 98.2  F (36.8  C), temperature source Oral, resp. rate 16, height 1.93 m (6' 4\"), weight 98.8 kg (217 lb 14.4 oz), SpO2 97 %.  Lab Results   Component Value Date    HGB 11.5 09/08/2017     Neurovascularly intact.  Calves are negative bilaterally, both soft and nontender.  The dressing is C/D/I.    A: Mr. Villalobos is doing well status post right total knee arthroplasty.    P:   1. Dressing change tomorrow.  2. Mobilize and continue physical therapy.   3. Anticipate discharge to home tomorrow pending PT progress.  D/C to independent living at [facility] with son Francesco available to care for patient.    Milena Feliciano PA-C  [phone]  "

## 2017-09-08 NOTE — PROGRESS NOTES
[facility]  Hospitalist Progress Note        Kodycharisma Jin Arvind, DO  09/08/2017        Interval History:      Patient states that he is doing well.          Assessment and Plan:        85-year-old male admitted for right total knee arthroplasty.     Status post right total knee arthroplasty. Completed on 9/7/17. under spinal anesthesia with a femoral nerve block.    - Plan per Orthopedics.     Ischemic cardiomyopathy, last recorded ejection fraction was 45%, 08/2017.  Coronary angiogram 08/2016 showed patent stents with mild disease left anterior descending and chronic occluded left circumflex.  He denies any chest pain, appeared to be hemodynamically stable throughout surgery.  He has a history of frequent premature ventricular contractions. Patient does admit that he stopped aspirin on his own but had no specific reason for this.   - ACEI with hold parameters.   - Per report, patient is not taking a statin, aspirin and beta blocker for unclear reasons, per report, son will follow up with Cardiology and/or primary care physician to find out if he should be taking these medications.      History of postop urinary retention, Flomax was started in the perioperative period.    - continue Flomax for now   - Will avoid anticholinergics, avoid Benadryl.     Glaucoma.  Continue eyedrops.     Hypothyroidism.  Continue Synthroid.     Abdominal aortic aneurysm, stable at 3.6 cm.    - Continue outpatient followup.  He has annual ultrasounds.     Diabetes type 2:  A1c is 5.8.    - Metformin.   - Accu-Cheks and sliding scale    Postoperative confusion, per patient report, although this was not confirmed. Dilaudid was also listed as an allergy on his preoperative history and physical  - avoid Benadryl, limit narcotics and monitor on fall precautions.    Chronic obstructive pulmonary disease.  He is not on medications.    - He is a former heavy smoker.     Deep venous thrombosis prophylaxis. Defer to Ortho,  "Lovenox is ordered.     Code:  Full.     Disposition: Per Ortho.                    Physical Exam:      Heart Rate: 69    Blood pressure 121/57, temperature 98.2  F (36.8  C), temperature source Oral, resp. rate 16, height 1.93 m (6' 4\"), weight 98.8 kg (217 lb 14.4 oz), SpO2 97 %.    Vitals:    17 0920   Weight: 98.8 kg (217 lb 14.4 oz)       Vital Sign Ranges  Temperature Temp  Av.6  F (36.4  C)  Min: 96.6  F (35.9  C)  Max: 98.3  F (36.8  C)   Blood pressure Systolic (24hrs), Av , Min:100 , Max:155        Diastolic (24hrs), Av, Min:57, Max:97      Pulse No Data Recorded   Respirations Resp  Av.3  Min: 8  Max: 20   Pulse oximetry SpO2  Av.5 %  Min: 94 %  Max: 98 %     Vital Signs with Ranges  Temp:  [96.6  F (35.9  C)-98.3  F (36.8  C)] 98.2  F (36.8  C)  Heart Rate:  [51-93] 69  Resp:  [8-20] 16  BP: (100-155)/(57-97) 121/57  SpO2:  [94 %-98 %] 97 %    I/O Last 3 Shifts:   I/O last 3 completed shifts:  In: 2100 [P.O.:600; I.V.:1500]  Out: 650 [Urine:500; Blood:150]    I/O past 24 hours:     Intake/Output Summary (Last 24 hours) at 17 0844  Last data filed at 17 0701   Gross per 24 hour   Intake             2100 ml   Output              850 ml   Net             1250 ml     GENERAL: Alert and oriented. NAD. Conversational, appropriate.   HEENT: Normocephalic. EOMI. No icterus or injection. Nares normal.   LUNGS: Clear to auscultation. No dyspnea at rest.   HEART: Regular rate. Extremities perfused.   ABDOMEN: Soft, nontender, and nondistended. Positive bowel sounds.   EXTREMITIES: No LE edema noted.   NEUROLOGIC: Moves extremities x4. No acute focal neurologic abnormalities noted.          Prior to Admission Medications:        Prescriptions Prior to Admission   Medication Sig Dispense Refill Last Dose     MetFORMIN HCl (GLUCOPHAGE XR PO) Take 500 mg by mouth At Bedtime   2017 at PM     BusPIRone HCl (BUSPAR PO) Take 10 mg by mouth 2 times daily as needed    2017 at " 0715     Nitroglycerin (NITROSTAT SL) Place 0.4 mg under the tongue every 5 minutes as needed for chest pain   never needed at PRN     tamsulosin (FLOMAX) 0.4 MG capsule Take 0.4 mg by mouth At Bedtime   9/6/2017 at PM     LEVOTHYROXINE SODIUM PO Take 25 mcg by mouth daily   9/7/2017 at 0715     Acetaminophen (TYLENOL PO) Take 1,000 mg by mouth daily (2 x 500mg = 1000mg dose)   9/7/2017 at 0715     diphenhydrAMINE-acetaminophen (TYLENOL PM)  MG tablet Take 2 tablets by mouth At Bedtime   9/6/2017 at PM     latanoprost (XALATAN) 0.005 % ophthalmic solution Place 1 drop into both eyes At Bedtime   9/6/2017 at pm     LISINOPRIL PO Take 5 mg by mouth daily    9/7/2017 at 0715     GABAPENTIN PO Take 300 mg by mouth At Bedtime    9/6/2017 at PM            Medications:        Current Facility-Administered Medications   Medication Last Rate     dextrose 5% and 0.45% NaCl + KCl 20 mEq/L 100 mL/hr at 09/07/17 2013     Current Facility-Administered Medications   Medication Dose Route Frequency     sodium chloride (PF)  3 mL Intracatheter Q8H     enoxaparin  40 mg Subcutaneous Q24H     acetaminophen  975 mg Oral Q8H     ketorolac  15 mg Intravenous Q6H     senna-docusate  1-2 tablet Oral BID     gabapentin (NEURONTIN) capsule 300 mg  300 mg Oral At Bedtime     levothyroxine (SYNTHROID/LEVOTHROID) tablet 25 mcg  25 mcg Oral QAM AC     lisinopril (PRINIVIL/ZESTRIL) tablet 5 mg  5 mg Oral Daily     metFORMIN (GLUCOPHAGE-XR) 24 hr tablet 500 mg  500 mg Oral At Bedtime     tamsulosin  0.4 mg Oral At Bedtime     latanoprost  1 drop Both Eyes At Bedtime     Current Facility-Administered Medications   Medication Dose Route Frequency     lidocaine (buffered or not buffered)  1 mL Other Q1H PRN     lidocaine 4%   Topical Q1H PRN     sodium chloride (PF)  3 mL Intracatheter Q1H PRN     sore throat lozenge  1-2 lozenge Buccal Q1H PRN     [START ON 9/10/2017] acetaminophen  650 mg Oral Q4H PRN     naloxone  0.1-0.4 mg Intravenous Q2  Min PRN     hydrOXYzine  10 mg Oral Q6H PRN     ondansetron  4 mg Oral Q6H PRN    Or     ondansetron  4 mg Intravenous Q6H PRN     prochlorperazine  5 mg Intravenous Q6H PRN    Or     prochlorperazine  5 mg Oral Q6H PRN     oxyCODONE  2.5-5 mg Oral Q3H PRN     busPIRone (BUSPAR) tablet 10 mg  10 mg Oral BID PRN     nitroGLYcerin (NITROSTAT) sublingual tablet 0.4 mg  0.4 mg Sublingual Q5 Min PRN     glucose  15-30 g Oral Q15 Min PRN    Or     dextrose  25-50 mL Intravenous Q15 Min PRN    Or     glucagon  1 mg Subcutaneous Q15 Min PRN     miconazole   Topical Q1H PRN            Data:      Lab data reviewed.     Recent Labs  Lab 09/08/17  0640 09/07/17  0940   HGB 11.5* 14.6   PLT  --  181   POTASSIUM  --  4.5   CR  --  1.10   GLC  --  119*           Imaging:      Imaging data reviewed.     Dr. Kody Samuels D.O.  [facility]  Pager [phone]

## 2017-09-08 NOTE — PLAN OF CARE
Problem: Goal Outcome Summary  Goal: Goal Outcome Summary  Outcome: Improving  A/O x4. Up Ax1 to dangle and stand at Bedside. VSS on 3L O2. Cms intact. Drsg C/D/I. Tolerating regular diet. Pain controlled with oxyCODONE and toradol. Kraft removed. DTV. Progressing per POC. Will cont to monitor.

## 2017-09-08 NOTE — PLAN OF CARE
Problem: Goal Outcome Summary  Goal: Goal Outcome Summary  PT:Order received; Patient seen POD #1 s/p R TKA;  Prior to admit patient was living in an independent living apt alone with use of a cane up until 2 weeks prior to surgery and then use of a rolling walker. No recent falls. Has 1 meal/day provided, otherwise patient does own bathing, dressing, and cooking. Has 9 children that plan to assist with cares but doesn't have anyone who can stay with patient.  Discharge Planner PT   Patient plan for discharge: Home with assist of children and OP PT per BPCI care plan  Current status: Patient mod/max A x 2 for supine to sit and min/mod A of 2 when scooting to EOB; difficulty maintaining sitting balance until feet on floor; no dizziness in sitting; max A x 1-2 to stand at bedside with rolling walker and bed elevated to assist standing. Impaired balance in standing  Barriers to return to prior living situation: Lives alone; won't have 24/7 assist for mobility needs; kids only able to check in on him; currently assist x 2 for mobility; high falls risk  Recommendations for discharge: defer to BPCI care plan  Rationale for recommendations: defer to BPCI care plan       Entered by: Lela Traore 09/08/2017 9:37 AM

## 2017-09-08 NOTE — PLAN OF CARE
Problem: Knee Replacement, Total (Adult)  Goal: Signs and Symptoms of Listed Potential Problems Will be Absent or Manageable (Knee Replacement, Total)  Signs and symptoms of listed potential problems will be absent or manageable by discharge/transition of care (reference Knee Replacement, Total (Adult) CPG).   Outcome: No Change  Pt A/Ox4, forgetful. VSS. Up 2 assist w/ walker. Reports pain 3/10 using oxycodone with relief. Mod CHO diet tolerated. Voiding adequately. Dressing c/d/i.

## 2017-09-08 NOTE — PLAN OF CARE
Problem: Goal Outcome Summary  Goal: Goal Outcome Summary  Outcome: No Change  Pt up with heavy 2, has voided 50 but , pt wanting to try to void more on his own.  Oxycodone for pain.  Tele Afib.

## 2017-09-08 NOTE — CONSULTS
REQUESTING PHYSICIAN:  Dr. Leonel Pederson.         PRIMARY CARE PHYSICIAN:  Dr. Era Aden.      REASON FOR CONSULTATION:  Postop management of multiple medical problems, including diabetes and coronary artery disease.      HISTORY OF PRESENT ILLNESS:  Chris Villalobos is an 85-year-old male who is status post right total knee arthroplasty earlier today.  This was done under spinal anesthesia with a femoral nerve block.  Patient appeared to be hemodynamically stable throughout the surgery.  He remained in sinus rhythm with PVCs.  The patient states he was confused and reports being combative in the recovery room, although I do not see any notes to indicate this.  He states he is slightly foggy now but otherwise doing well.  He has a history of coronary artery disease and ischemic cardiomyopathy.  He denies any chest pain or shortness of breath.      His cardiac history is listed extensively on his preoperative H&P by Dr. Aden and followed by Cardiology.  Patient only takes lisinopril for his history of heart disease.  He is not on a statin, aspirin or beta blocker for unclear reasons.      He has a history of postoperative urinary retention.  According to his son, who provides a majority of the history, he is on tamsulosin, which was started perioperatively to help prevent postop urinary retention.  Patient currently has a Kraft catheter in.      PAST MEDICAL HISTORY:   1.  Ischemic cardiomyopathy.  Status post bare-metal stent to the RCA in 2009.  Patient states he has 2 stents but unable to confirm.  His last coronary angiogram was 08/2016.  He had pain stent with mid disease in the LAD and chronic left circumflex occlusion.   2.  Frequent PVCs.   3.  Abdominal aortic aneurysm measuring 3.6 cm on 08/18/2017, stable (has followup annual echocardiograms).   4.  Prostate cancer treated with Lupron.  Is followed by Dr. Marie at [facility].   5.  History of bladder cancer.   6.  Right bundle branch block.  "  7.  Colon cancer, status post resection.   8.  COPD (not on inhalers).   9.  History of skin cancer.   10.  Macular degeneration.   11.  Glaucoma.   12.  Hypertension.   13.  Dyslipidemia.   14.  Diabetes type 2, on oral medications.  A1c is 5.8.   15.  History of alcohol abuse in the past.   16.  Spinal stenosis.   17.  Osteoarthritis.   18.  B12 deficiency.   19.  History of postoperative urinary retention.      PAST SURGICAL HISTORY:   1.  Appendectomy, age 19.   2.  Hemicolectomy for colon cancer.   3.  Retinal detachment surgery, left eye.   4.  Bare-metal stents RCA, 2009.   5.  Cryoablation of the prostate in 2010.   6.  Bladder fulguration, 2010.   7.  Cystoscopy and TURP.   8.  Mohs' procedure for bladder cancer.      HOME MEDICATIONS:   1.  BuSpar 10 mg b.i.d. as needed.   2.  Tylenol PM at night as needed.   3.  Gabapentin 300 mg at bedtime.   4.  Levothyroxine 25 mcg per day.   5.  Lisinopril 5 mg daily.   6.  Metformin extended release 500 mg at bedtime.   7.  Nitroglycerin p.r.n. (has never needed).   8.  Flomax 0.4 mg at night (started prior to surgery for postoperative urinary retention prophylaxis).   9.  Travatan 0.004% one drop both eyes at night.      SOCIAL HISTORY:  He is recently .  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  He is retired.  He worked in marketing.  Quit tobacco use, heavy use in the past, up to 3-1/2 packs per day of [place].  History of alcohol abuse, quit 46 years ago.      REVIEW OF SYSTEMS:  States overall health has been good.  He admits he is quite sedentary in his lifestyle but could walk 2 blocks \"if I had to.\"  Otherwise, a full extensive 10-point review of systems was obtained and is otherwise negative, except that stated above.      PHYSICAL EXAMINATION:   VITAL SIGNS:  Temperature is 97.8, pulse 73, respirations 12, blood pressure 130/79, saturations 98% on 2 liters of oxygen.   GENERAL APPEARANCE:  He is very pleasant, appears younger than his stated " age.   SKIN:  Warm and dry.   HEENT:  Pupils equal, round, react to light.  Mucous membranes slightly dry.  He is wearing dentures.   NECK:  Supple, carotids +2 without bruits.  No adenopathy to neck/clavicals.   CARDIOVASCULAR:  S1, S2, regular rate and rhythm without murmurs but distant heart tones.   LUNGS:  Decreased throughout.  No wheezes, rales or rhonchi.   ABDOMEN:  Soft, nontender, nondistended.  Bowel sounds are active.  No organomegaly, masses or bruits.   GENITOURINARY:  Kraft is draining clear soco urine.   EXTREMITIES:  Right leg is in a large Ace wrap.  Left leg is warm.  Pulses are +2 on the left.   NEUROLOGIC:  Speech is fluent and clear.  He is a good historian.      LABORATORY DATA:  Potassium 4.5, creatinine 1.1, glucose 121.  Hemoglobin 14.6, platelets 181.  Labs are reviewed from his preoperative history and physical, essentially unremarkable, except for potassium of 5.3 on preop but normalized today.      ASSESSMENT AND PLAN:     1.  This is an 85-year-old male postop day 0 status post right total knee arthroplasty under spinal anesthesia with a femoral nerve block.  Plan per Orthopedics.   2.  Ischemic cardiomyopathy, last recorded ejection fraction was 45%, 08/2017.  Coronary angiogram 08/2016 showed patent stents with mild disease left anterior descending and chronic occluded left circumflex.  He denies any chest pain, appeared to be hemodynamically stable throughout surgery.  He has a history of frequent premature ventricular contractions.  Plan:  Will monitor on telemetry.  Will continue his ACE inhibitor with holding parameters.  Patient is not taking a statin, aspirin and beta blocker for unclear reasons.  Son will follow up with Cardiology and/or primary care physician to find out if he should be taking these medications.  Patient does admit that he stopped aspirin on his own but had no specific reason for this.   3.  History of postop urinary retention, Flomax was started in the  perioperative period.  Will continue Flomax for now until after he voids once the Kraft catheter has been removed.  According to the son, this is a temporary medication and is not planned to be on this long-term.  Will avoid anticholinergics, avoid Benadryl.   4.  Glaucoma.  Continue eyedrops.   5.  Hypothyroidism.  Continue Synthroid.   6.  Abdominal aortic aneurysm, stable at 3.6 cm.  Continue outpatient followup.  He has annual ultrasounds.   7.  Diabetes type 2:  A1c is 5.8.  Will resume metformin tomorrow night as long as he continues to eat; he is just starting to eat now.  Metformin was listed as allergy, but he has been taking this without any problems; thus I removed it from his list.  Monitor his blood sugars with Accu-Cheks and sliding scale, but if his blood sugars remain stable, will not plan to monitor these 4 times per day while he is here, since he does not monitor at home.   8.  Postoperative confusion, per patient report, although this was not confirmed.  For now, will avoid Benadryl, limit narcotics and monitor, place him on fall precautions.  Dilaudid was also listed as an allergy on his preoperative history and physical, although no one seemed to know the reaction of Dilaudid, but just in case, I since have added Dilaudid to his allergy list here.   9.  Chronic obstructive pulmonary disease.  He is not on medications.  He is a former heavy smoker.   10.  Deep venous thrombosis prophylaxis.  Lovenox is ordered by Orthopedics.  Activity per Orthopedics.   11.  Code status:  Full.  This was confirmed with the patient's son, who provided the majority of the history.         TOOTIE DENNEY MD       As dictated by BENSON MCKINNON PA-C            D: 2017 18:44   T: 2017 19:49   MT: TD      Name:     BERYL GOMEZ   MRN:      [id number]        Account:       [id number]   :      1932           Consult Date:  2017      Document: [id number]       cc: Era Aden MD        Leonel Thrasher MD

## 2017-09-08 NOTE — PLAN OF CARE
Problem: Goal Outcome Summary  Goal: Goal Outcome Summary  Outcome: Improving  Pt arrived from PACU ~1715. A&O. Dangled, tolerated well. Minimal pain managed by oxycodone. Pt on tele.

## 2017-09-08 NOTE — PROGRESS NOTES
09/08/17 0900   Quick Adds   Type of Visit Initial PT Evaluation   Living Environment   Lives With alone   Living Arrangements apartment;independent living facility  ([facility])   Home Accessibility no concerns   Number of Stairs to Enter Home 0   Number of Stairs Within Home 0   Transportation Available car;family or friend will provide   Living Environment Comment patient was living independently prior to admit; gets 1 meal   Self-Care   Usual Activity Tolerance moderate   Current Activity Tolerance fair   Regular Exercise yes   Activity/Exercise Type walking   Exercise Amount/Frequency 3-5 times/wk   Equipment Currently Used at Home cane, straight;walker, rolling  (has use of a wheelchair as needed)   Activity/Exercise/Self-Care Comment patient independent with use of cane/walker   Functional Level Prior   Ambulation 1-->assistive equipment   Transferring 1-->assistive equipment   Toileting 1-->assistive equipment   Bathing 0-->independent   Dressing 0-->independent   Eating 0-->independent   Communication 0-->understands/communicates without difficulty   Swallowing 0-->swallows foods/liquids without difficulty   Cognition 0 - no cognition issues reported   Fall history within last six months no   Which of the above functional risks had a recent onset or change? ambulation;transferring   Prior Functional Level Comment patient was primarily independent with cane; last 2 weeks was using a walker   General Information   Onset of Illness/Injury or Date of Surgery - Date 09/07/17   Referring Physician Leonel Thrasher MD   Patient/Family Goals Statement BPCI plan for home with assist from children and OP PT   Pertinent History of Current Problem (include personal factors and/or comorbidities that impact the POC) Chris Villalobos is an 85-year-old male who is status post right total knee arthroplasty; He has a history of coronary artery disease and ischemic cardiomyopathy; see medical record for further information    Precautions/Limitations fall precautions   Weight-Bearing Status - RLE weight-bearing as tolerated   General Observations Patient in bed; VSS   General Info Comments Activity:ambulate with assist   Cognitive Status Examination   Orientation orientation to person, place and time   Level of Consciousness alert   Follows Commands and Answers Questions 75% of the time;100% of the time  ([place]?)   Personal Safety and Judgment intact   Memory intact   Pain Assessment   Patient Currently in Pain Yes, see Vital Sign flowsheet  (2-3)   Integumentary/Edema   Integumentary/Edema Comments R knee incision; ace wrapped   Posture    Posture Comments mildly forward flexed at trunk; spinal stenosis   Range of Motion (ROM)   ROM Comment R LE limited by recent surgery and pain; others appear WFL   Strength   Strength Comments R LE limited by recent surgery and pain   Bed Mobility   Bed Mobility Comments Patient needing mod/max A x 2 for bed mobility; difficulty with sitting balance   Transfer Skills   Transfer Comments sit<>stand with use of a rolling walker, bed elevated and max A x 2   Gait   Gait Comments able to take steps from bed to bedside chair with min/mod A of 1 and walker   Balance   Balance Comments current need for a walker in standing and assist; assist for sitting balance until feet on floor at EOB   Sensory Examination   Sensory Perception Comments intact   Modality Interventions   Planned Modality Interventions Comments ice to R knee   General Therapy Interventions   Planned Therapy Interventions bed mobility training;gait training;ROM;strengthening;stretching;transfer training;progressive activity/exercise   Clinical Impression   Criteria for Skilled Therapeutic Intervention yes, treatment indicated   PT Diagnosis Impaired gait/transfers   Influenced by the following impairments decreased R knee ROM/strength, pain, impaired balance, spinal stenosis;   Functional limitations due to impairments impaired independence  "with functional mobility   Clinical Presentation Stable/Uncomplicated   Clinical Presentation Rationale mod/max A for mobility; lives alone   Clinical Decision Making (Complexity) Low complexity   Therapy Frequency` 2 times/day   Predicted Duration of Therapy Intervention (days/wks) 3 days   Anticipated Equipment Needs at Discharge front wheeled walker   Anticipated Discharge Disposition Transitional Care Facility;Home with Assist;Home with Outpatient Therapy  (Pending progress; BPCI plan is for home with OP PT/assist)   Risk & Benefits of therapy have been explained Yes   Patient, Family & other staff in agreement with plan of care Yes   [facility] TM \"6 Clicks\"   2016, Trustees of [facility], under license to [employer].  All rights reserved.   6 Clicks Short Forms Basic Mobility Inpatient Short Form   [facility]  \"6 Clicks\" V.2 Basic Mobility Inpatient Short Form   1. Turning from your back to your side while in a flat bed without using bedrails? 3 - A Little   2. Moving from lying on your back to sitting on the side of a flat bed without using bedrails? 2 - A Lot   3. Moving to and from a bed to a chair (including a wheelchair)? 2 - A Lot   4. Standing up from a chair using your arms (e.g., wheelchair, or bedside chair)? 2 - A Lot   5. To walk in hospital room? 3 - A Little   6. Climbing 3-5 steps with a railing? 1 - Total   Basic Mobility Raw Score (Score out of 24.Lower scores equate to lower levels of function) 13   Total Evaluation Time   Total Evaluation Time (Minutes) 15     "

## 2017-09-09 NOTE — PROGRESS NOTES
"Orthopedic Surgery  9/9/2017  POD #2    S: Patient voices no complaints today.     O: Blood pressure 105/51, temperature 97.3  F (36.3  C), temperature source Oral, resp. rate 16, height 1.93 m (6' 4\"), weight 98.8 kg (217 lb 14.4 oz), SpO2 92 %.  Lab Results   Component Value Date    HGB 10.9 09/09/2017     Neurovascularly intact.  Calves are negative bilaterally, both soft and nontender.  The dressing is C/D/I.  The wound looks good with minimal erythema of the surrounding skin.    A: Mr. Villalobos is doing well status post Procedure(s):  ARTHROPLASTY KNEE.    P: Continue physical therapy. Anticipate discharge to home on tomorrow.    Leonel Pederson  [phone]    "

## 2017-09-09 NOTE — PLAN OF CARE
"Problem: Goal Outcome Summary  Goal: Goal Outcome Summary  OT: Order received, eval completed, treatment initiated.  Patient seen s/p R TKA;  Prior to admit patient was living in an independent living apt alone with use of a cane up until 2 weeks prior to surgery and then use of a rolling walker. No recent falls. Has 1 meal/day provided, otherwise patient had been doing his own bathing, dressing, and cooking, but in the week leading up to surgery pt's spinal stenosis was \"flaring\" according to pt and family, and pt req assist for dressing, bathing, and was spending most of his day in recliner. Has 9 children that plan to assist with cares but doesn't have anyone who can stay with patient.    Discharge Planner OT   Patient plan for discharge: Home with assist from family  Current status: Pt req MOD A of 2 for transfer, requires MAX A to manage R LE during transfer and for limb management with adjustments to foot rests and positions. Pt demonstrates reduced insight into deficits, reduced safety awareness, and has moments of possible confusion/reduced cognition. Pt req MOD A for LE dressing, but feels he would be able to complete independently at home. Pt demonstrates strong motivation to return to PLOF, and commitment to therapy. Is motivated to return home but presents with a significant fall risk, when pt asked his plan for overnight ambulation and transfer to bathroom pt states \"he will just have to wet himself until help comes in the morning.\"  Barriers to return to prior living situation: Level of assist needed, A of 2 for mobility, pain, pt does not have 24 hour assistance.   Recommendations for discharge: defer to [facility] care plan  Rationale for recommendations: defer to [facility] care plan       Entered by: Ria Toussaint 09/09/2017 4:26 PM         "

## 2017-09-09 NOTE — PLAN OF CARE
Problem: Goal Outcome Summary  Goal: Goal Outcome Summary  Outcome: Improving  Patient A&Ox4, but forgetful. VSS on 2L NC. Tele SR w/ 1st degree AVB, bigeminal PVC s, BBB hospitalist made aware. CMS intact. Post-op dressing clean, dry, and intact. Nausea overnight Zofran given x1 with relief. Patient up with the assist of two, voiding adequately.  Patient slept between cares. Will continue to monitor.

## 2017-09-09 NOTE — PLAN OF CARE
Problem: Knee Replacement, Total (Adult)  Goal: Signs and Symptoms of Listed Potential Problems Will be Absent or Manageable (Knee Replacement, Total)  Signs and symptoms of listed potential problems will be absent or manageable by discharge/transition of care (reference Knee Replacement, Total (Adult) CPG).   Outcome: No Change  Pt A/Ox4. VSS. Up 2 assist w/ walker. Reports pain 4/10 using tylenol with relief. Mod CHO diet tolerated. Dressing changed. Voiding adequately.

## 2017-09-09 NOTE — PROGRESS NOTES
[facility]    Hospitalist Progress Note  Juve Cummings MD    Assessment & Plan     85-year-old male, with PmHx of ischemic cardiomyopathy, COPD, colon cancer, prostate cancer, hypertension, dyslipidemia, macular degeneration, who was admitted for a planned right total knee arthroplasty. Hospitalist service was consulted, for postop medical management.     1. POD# 2 Right total knee arthroplasty: for management per Orthopedic Surgeon. Continue scheduled tylenol. For Oxycodone IR prn. Continue PT/OT   as tolerated. For incentive spirometry prn.       2. Ischemic cardiomyopathy: clinically stable. ECHO on 8/18/17 was LVEF 45%. Continue lisinopril, with hold parameters.      3. Postop urinary retention: Continue Flomax.     4. Glaucoma: Continue eyedrops.      5. Hypothyroidism: Continue levothyroxine.       6. Type 2 Diabetes: BG was 128 this am. HbA1C is 5.8% on 7/27/16. Continue metformin.     7. Chronic obstructive pulmonary disease: pt is not on any medications. For alb nebs prn.         DVT Prophylaxis: Enoxaparin (Lovenox) SQ  Code Status: Prior    Disposition: Expected discharge in 1 day.      Interval History   The pt reported feeling fine. No complaints of chest pain/sob/palpitations. He is tolerating therapy.    -Data reviewed today: I reviewed all new labs and imaging results over the last 24 hours. I personally reviewed no images or EKG's today.    Physical Exam   Temp: 98.4  F (36.9  C) Temp src: Axillary BP: 105/54   Heart Rate: 90 Resp: 16 SpO2: 93 % O2 Device: None (Room air) Oxygen Delivery: 2 LPM  Vitals:    09/07/17 0920   Weight: 98.8 kg (217 lb 14.4 oz)     Vital Signs with Ranges  Temp:  [97.5  F (36.4  C)-98.4  F (36.9  C)] 98.4  F (36.9  C)  Heart Rate:  [48-90] 90  Resp:  [16-20] 16  BP: (105-148)/(49-66) 105/54  SpO2:  [93 %-96 %] 93 %  I/O last 3 completed shifts:  In: 1040 [P.O.:240; I.V.:800]  Out: 875 [Urine:875]    Constitutional: Elderly white male, awake,  cooperative, no apparent distress, O2 Sats 94% on RA  Respiratory: BS vesicular bilaterally, but reduced globally, no crackles or wheezing  Cardiovascular: S1 and S2, reg, no murmur noted  GI: Soft, non-distended, non-tender, no masses, BS present+  Skin/Integumen: No rashes  Extremities: No pedal edema. Right knee joint surgical dressing     Medications     dextrose 5% and 0.45% NaCl + KCl 20 mEq/L Stopped (09/08/17 1729)       sodium chloride (PF)  3 mL Intracatheter Q8H     enoxaparin  40 mg Subcutaneous Q24H     acetaminophen  975 mg Oral Q8H     senna-docusate  1-2 tablet Oral BID     gabapentin (NEURONTIN) capsule 300 mg  300 mg Oral At Bedtime     levothyroxine (SYNTHROID/LEVOTHROID) tablet 25 mcg  25 mcg Oral QAM AC     lisinopril (PRINIVIL/ZESTRIL) tablet 5 mg  5 mg Oral Daily     metFORMIN (GLUCOPHAGE-XR) 24 hr tablet 500 mg  500 mg Oral At Bedtime     tamsulosin  0.4 mg Oral At Bedtime     latanoprost  1 drop Both Eyes At Bedtime       Data     Recent Labs  Lab 09/09/17  0555 09/08/17  0640 09/07/17  0940   HGB 10.9* 11.5* 14.6   PLT  --   --  181   *  --   --    POTASSIUM 4.9  --  4.5   CHLORIDE 99  --   --    CO2 23  --   --    BUN 28  --   --    CR 1.03  --  1.10   ANIONGAP 7  --   --    ADRIANO 8.1*  --   --    *  --  119*       No results found for this or any previous visit (from the past 24 hour(s)).

## 2017-09-09 NOTE — PROVIDER NOTIFICATION
On call Hospitalist paged regarding changes in Pt's tele strip, per hospitalist will continue to monitor.

## 2017-09-09 NOTE — PROGRESS NOTES
09/09/17 1530   Quick Adds   Type of Visit Initial Occupational Therapy Evaluation   Living Environment   Lives With alone   Living Arrangements apartment;independent living facility  ([facility])   Home Accessibility no concerns   Number of Stairs to Enter Home 0   Number of Stairs Within Home 0   Transportation Available car;family or friend will provide   Living Environment Comment Pt was IND in ILF, however in the last week family has had to come in 2x per day to assist with dressing and ADLs   Self-Care   Usual Activity Tolerance fair   Current Activity Tolerance fair   Regular Exercise yes   Activity/Exercise Type walking   Exercise Amount/Frequency 3-5 times/wk   Equipment Currently Used at Home cane, straight;walker, rolling  (has use of a wheelchair as needed)   Activity/Exercise/Self-Care Comment Pt was IND with use of cane and walker but over last week has had increased difficutly and has not been getting up from recliner except to use bathroom.   Functional Level Prior   Ambulation 1-->assistive equipment   Transferring 1-->assistive equipment   Toileting 1-->assistive equipment   Bathing 0-->independent   Dressing 0-->independent   Eating 0-->independent   Communication 0-->understands/communicates without difficulty   Swallowing 0-->swallows foods/liquids without difficulty   Cognition 0 - no cognition issues reported   Fall history within last six months no   Which of the above functional risks had a recent onset or change? ambulation;transferring   Prior Functional Level Comment patient was primarily independent with cane; last 2 weeks was using a walker, and last week using walker and wheechair   General Information   Onset of Illness/Injury or Date of Surgery - Date 09/07/17   Referring Physician Edu   Patient/Family Goals Statement To be as independent as I was   Additional Occupational Profile Info/Pertinent History of Current Problem Pt is a 84 yo male with history of spinal stenosis, AAA  (stable), and COPD, who underwent R TKA on 9/7/17.   Precautions/Limitations fall precautions   Weight-Bearing Status - RLE weight-bearing as tolerated   Cognitive Status Examination   Orientation orientation to person, place and time   Level of Consciousness alert   Personal Safety (Cognitive) decreased insight to deficits   Executive Function Planning ability impaired   Sensory Examination   Sensory Quick Adds Pain   Pain Assessment   Patient Currently in Pain Yes, see Vital Sign flowsheet   Range of Motion (ROM)   ROM Comment BUE WFL   Strength   Strength Comments BUE WFL   Coordination   Upper Extremity Coordination No deficits were identified   Mobility   Bed Mobility Comments MOD A   Transfer Skills   Transfer Comments MOD A of 2   Transfer Skill: Bed to Chair/Chair to Bed   Level of [place]: Bed to Chair moderate assist (50% patients effort)   Physical Assist/Nonphysical Assist: Bed to Chair 2 persons   Weight-Bearing Restrictions weight-bearing as tolerated   Assistive Device - Transfer Skill Bed to Chair Chair to Bed Rehab Eval rolling walker   Transfer Skill: Sit to Stand   Level of [place]: Sit/Stand moderate assist (50% patients effort)   Physical Assist/Nonphysical Assist: Sit/Stand 2 persons   Transfer Skill: Sit to Stand weight-bearing as tolerated   Assistive Device for Transfer: Sit/Stand rolling walker   Transfer Skill: Toilet Transfer   Level of [place]: Toilet maximum assist (25% patients effort)   Physical Assist/Nonphysical Assist: Toilet 2 persons   Weight-Bearing Restrictions: Toilet weight-bearing as tolerated   Assistive Device rolling walker;seat riser;grab bars   Balance   Balance Comments Reduced static and dynamic balance   Bathing   Level of [place] moderate assist (50% patients effort)   Upper Body Dressing   Level of [place]: Dress Upper Body stand-by assist   Lower Body Dressing   Level of [place]: Dress Lower Body maximum assist (25% patients effort)  "  Toileting   Level of [place]: Toilet maximum assist (25% patients effort)   Grooming   Level of [place]: Grooming stand-by assist   Eating/Self Feeding   Level of [place]: Eating independent   Instrumental Activities of Daily Living (IADL)   IADL Comments Unclear at time of eval, possibly inconsistent hisorian   Activities of Daily Living Analysis   Impairments Contributing to Impaired Activities of Daily Living balance impaired;cognition impaired;motor control impaired;pain;ROM decreased;strength decreased   General Therapy Interventions   Planned Therapy Interventions ADL retraining   Clinical Impression   Criteria for Skilled Therapeutic Interventions Met yes, treatment indicated   OT Diagnosis Reduced IND in ADLs   Influenced by the following impairments balance impaired;cognition impaired;motor control impaired;pain;ROM decreased;strength decreased   Assessment of Occupational Performance 3-5 Performance Deficits   Identified Performance Deficits dressing, bathing, functional mobility, toileting   Clinical Decision Making (Complexity) Moderate complexity   Therapy Frequency daily   Predicted Duration of Therapy Intervention (days/wks) 3 days   Anticipated Discharge Disposition Other (see comments)  (Refer to BCPI plan)   Risks and Benefits of Treatment have been explained. Yes   Patient, Family & other staff in agreement with plan of care Yes   [facility] TM \"6 Clicks\"   2016, Trustees of [facility], under license to [employer].  All rights reserved.   6 Clicks Short Forms Basic Mobility Inpatient Short Form   [facility]  \"6 Clicks\" Daily Activity Inpatient Short Form   1. Putting on and taking off regular lower body clothing? 2 - A Lot   2. Bathing (including washing, rinsing, drying)? 2 - A Lot   3. Toileting, which includes using toilet, bedpan or urinal? 2 - A Lot   4. Putting on and taking off regular upper body clothing? 3 - A Little   5. Taking care " of personal grooming such as brushing teeth? 3 - A Little   6. Eating meals? 4 - None   Daily Activity Raw Score (Score out of 24.Lower scores equate to lower levels of function) 16   Total Evaluation Time   Total Evaluation Time (Minutes) 10

## 2017-09-09 NOTE — PLAN OF CARE
Problem: Goal Outcome Summary  Goal: Goal Outcome Summary  Discharge Planner PT   Patient plan for discharge: Home with assist of children and OP PT per BPCI care plan  Current status: Sit to/from stand with mod A x 2 or max A x 1. Poor initial standing balance. Needs min A and bilat UE support on FWW for safe static standing. Patient ambulates 4' and 20' with FWW and min A. Patient noted to be very unsteady and with poor weight acceptance on the R LE.   Barriers to return to prior living situation: Lives alone; won't have 24/7 assist for mobility needs; kids only able to check in on him; currently assist x 2 for mobility; high falls risk. Poor activity tolerance.   Recommendations for discharge: defer to BPCI care plan  Rationale for recommendations: defer to BPCI care plan       Entered by: Patricia Adrian 09/09/2017 11:15 AM        Spoke with care coordinator regarding significant safety concerns with current discharge plan.

## 2017-09-10 NOTE — PLAN OF CARE
"Problem: Goal Outcome Summary  Goal: Goal Outcome Summary      Discharge Planner OT   Patient plan for discharge: Home with assist from family  Current status: Pt completed two sit to stand trials, pt req MOD-MAX A of 2 for first trail, pt req cues for sequencing, pt demonstrates fear of pain and falling. Pt req MAX A to manage R LE when going to sit. Second trial improved, pt req MOD A of 2 to stand, MIN A of 2 once standing, pt attempted to take several steps with a shuffling pattern was able to move approximately 2 feet prior to asking to sit due to LE weakness. Pt frequently stating \"don't let go, don't let go.\" Pt req an extended rest. Pt completed SLUMS with a score of 19/30 indicating cognitive impairment. Pt reports his family has agreed to stay overnight and he will now have 24 hr assist. Provided ed regarding use of bedside commode at home/urinal, and discussed strategies for car transfer. Pt highly motivated, and reports high level of support from family.   Barriers to return to prior living situation: Level of assist needed for ADLs, A of 2 for transfers and mobility, pain, fall risk, cognition   Recommendations for discharge: defer to BPCI care plan  Rationale for recommendations: defer to BPCI care plan       Entered by: Ria Toussaint 09/10/2017 10:11 AM         "

## 2017-09-10 NOTE — PROGRESS NOTES
Pt completed 2nd PT session with 3 son's present.  Francesco, the primary contact for son, would feel more comfortable if pt stayed one more night, with the plan of discharging home tomorrow.  Family also asking to speak to  regarding home care or placement.  Explained to pt that SW not able to speak with pt without permission from MD.  Explained that if family does not feel comfortable taking pt home at this time, pt is able to stay one more night per Dr. Zarco and can address concerns with Dr. Zarco tomorrow.  Francesco with be the contact for MD and agreeable to this plan.

## 2017-09-10 NOTE — PROGRESS NOTES
"Orthopedic Surgery  9/10/2017  POD #3    S: Patient voices no complaints today.     O: Blood pressure 118/52, temperature 98.5  F (36.9  C), resp. rate 16, height 1.93 m (6' 4\"), weight 98.8 kg (217 lb 14.4 oz), SpO2 96 %.  Lab Results   Component Value Date    HGB 10.9 09/09/2017     Neurovascularly intact.  Calves are negative bilaterally, both soft and nontender.  The dressing is C/D/I.      A: Mr. Villalobos is doing well status post Procedure(s):  ARTHROPLASTY KNEE.    P: Continue physical therapy. Anticipate discharge to HOME on TODAY.    I HAVE DISCUSSED IN DETAIL WITH PATIENT, AND THERE WILL BE FAMILY 24/7 AT HOME WITH HIM OVER NEXT FEW DAYS.    Leonel Pederson  [phone]    "

## 2017-09-10 NOTE — PLAN OF CARE
Problem: Goal Outcome Summary  Goal: Goal Outcome Summary  Outcome: Improving  Patient A&Ox4, forgetful at times. VSS. CMS intact. Dressing clean, dry, and intact. Pain managed with scheduled Tylenol. Tele SR w/ BBB & bigeminal PVC's. Voiding adequately in urinal.  and 100. Patient slept between cares. Will continue to monitor.

## 2017-09-10 NOTE — PROGRESS NOTES
[facility]    Hospitalist Progress Note  Juve Cummings MD    Assessment & Plan     85-year-old male, with PmHx of ischemic cardiomyopathy, COPD, colon cancer, prostate cancer, hypertension, dyslipidemia, macular degeneration, who was admitted for a planned right total knee arthroplasty. Hospitalist service was consulted, for postop medical management.     1. POD# 3 Right total knee arthroplasty: for management per Orthopedic Surgeon. Continue scheduled tylenol.   For Oxycodone IR prn. Continue PT/OT as tolerated.       2. Ischemic cardiomyopathy: clinically stable. ECHO on 8/18/17 was LVEF 45%. Continue lisinopril, with hold parameters.      3. Postop urinary retention: Continue Flomax.     4. Glaucoma: Continue eyedrops.      5. Hypothyroidism: Continue levothyroxine.       6. Type 2 Diabetes: BG was 128 this am. HbA1C is 5.8% on 7/27/16. Continue metformin.     7. Chronic obstructive pulmonary disease: pt is not on any medications.         DVT Prophylaxis: Enoxaparin (Lovenox) SQ  Code Status: Prior    Disposition: Expected discharge today, per Orthopedics.      Interval History   The pt reported feeling fine. He has right knee pain with activity. No complaints of chest pain/sob/palpitations.     -Data reviewed today: I reviewed all new labs and imaging results over the last 24 hours. I personally reviewed no images or EKG's today.    Physical Exam   Temp: 98.5  F (36.9  C) Temp src: Oral BP: 118/52   Heart Rate: 56 Resp: 16 SpO2: 96 % O2 Device: Nasal cannula Oxygen Delivery: 2 LPM  Vitals:    09/07/17 0920   Weight: 98.8 kg (217 lb 14.4 oz)     Vital Signs with Ranges  Temp:  [97.7  F (36.5  C)-98.5  F (36.9  C)] 98.5  F (36.9  C)  Heart Rate:  [48-90] 56  Resp:  [16-18] 16  BP: (118-137)/(51-62) 118/52  SpO2:  [94 %-96 %] 96 %  I/O last 3 completed shifts:  In: 520 [P.O.:520]  Out: 700 [Urine:700]    Constitutional: Elderly white male, awake, cooperative, no apparent distress, O2  Sats 96% on 2L via NC  Respiratory: BS vesicular bilaterally, but reduced globally, no crackles or wheezing  Cardiovascular: S1 and S2, reg, no murmur noted  GI: Soft, non-distended, non-tender, no masses, BS present+  Skin/Integumen: No rashes  Extremities: No pedal edema. Right knee joint surgical dressing     Medications     dextrose 5% and 0.45% NaCl + KCl 20 mEq/L Stopped (09/08/17 1729)       sodium chloride (PF)  3 mL Intracatheter Q8H     enoxaparin  40 mg Subcutaneous Q24H     acetaminophen  975 mg Oral Q8H     senna-docusate  1-2 tablet Oral BID     gabapentin (NEURONTIN) capsule 300 mg  300 mg Oral At Bedtime     levothyroxine (SYNTHROID/LEVOTHROID) tablet 25 mcg  25 mcg Oral QAM AC     lisinopril (PRINIVIL/ZESTRIL) tablet 5 mg  5 mg Oral Daily     metFORMIN (GLUCOPHAGE-XR) 24 hr tablet 500 mg  500 mg Oral At Bedtime     tamsulosin  0.4 mg Oral At Bedtime     latanoprost  1 drop Both Eyes At Bedtime       Data     Recent Labs  Lab 09/10/17  0621 09/09/17  0555 09/08/17  0640 09/07/17  0940   HGB  --  10.9* 11.5* 14.6   *  --   --  181   NA  --  129*  --   --    POTASSIUM  --  4.9  --  4.5   CHLORIDE  --  99  --   --    CO2  --  23  --   --    BUN  --  28  --   --    CR 1.04 1.03  --  1.10   ANIONGAP  --  7  --   --    ADRIANO  --  8.1*  --   --    * 128*  --  119*       No results found for this or any previous visit (from the past 24 hour(s)).

## 2017-09-10 NOTE — PLAN OF CARE
Problem: Goal Outcome Summary  Goal: Goal Outcome Summary  Outcome: Improving  Pt A&O, forgetful at times. CMS intact, dressing cdi. Has generalized weakness, discharge postponed until tomorrow per patient and family. Denies pain when at rest. Continue to monitor.

## 2017-09-10 NOTE — PLAN OF CARE
Problem: Goal Outcome Summary  Goal: Goal Outcome Summary  Discharge Planner PT   Patient plan for discharge: Home with assist of children and OP PT per [facility]PCI care plan  Current status: Sit to/from stand with mod A x 2 persons. Patient ambulates 6' and 25' with FWW and CGA. Close wheelchair follow for safety. Patient with rapid descent onto bed following second short walk. Sit to supine with mod A x 2. Tolerates supine R LE exercises.   Barriers to return to prior living situation: Heavy assist needed for mobility (RN confirmed with son patient will have 24/7 assist for at least one week). Family planning to attend PM PT session for safe mobility/assist training.   Recommendations for discharge: defer to BPCI care plan  Rationale for recommendations: defer to BPCI care plan       Entered by: Patricia Adrian 09/10/2017 10:38 AM        PM session: Family present for training: Camilla Baron. Patient requires mod to max A x 2 for bed mobility and sit to/from stand transfers. Pt ambulates 5' and 10' with FWW and CGA. Educated on safe caregiver positioning/handling. Discussed equipment needs at home: urinal, bedside commode, gait belt. Encouraged wheelchair follow for any distance ambulation for patient safety. Educated on proper walker height. Educated that patient would likely need assist for all bathing, dressing and bathroom hygiene. Briefly discussed how family can provide safe pericares: in supine or in standing (with walker and Ax2). Family asking about discharge plan. Updated charge RN.

## 2017-09-11 NOTE — PLAN OF CARE
Problem: Goal Outcome Summary  Goal: Goal Outcome Summary  Outcome: No Change  A&O. VSS. CMS intact. Drsg CDI. Pain reduced with PRN tylenol, oxycodone. Up with 1-2. UOP good. Plan to discharge today.

## 2017-09-11 NOTE — PLAN OF CARE
Problem: Goal Outcome Summary  Goal: Goal Outcome Summary  Discharge Planner PT   Patient plan for discharge: Home with assist of children and OP PT per BPCI care plan  Current status: Upon entering room, pt found to be incontinent of urine. Supine to sit with mod A x 1 and min A of another. Sit to/from stand with mod A x 2 persons from bed. Initial standing balance is poor and pt needs min A x 2 persons to gain upright position. Able progress to CGA when feet are equally placed. Pt able to stand for 3 minutes with bilat UE support on FWW, CGA of one person, while another provides total assist for pericares and brief management. Performs 2 sit to/from stands from wheelchair, one attempt with mod A x2 and another attempt with mod A x 1 and min A of another. Pt is able to ambulate 40' with FWW, CGA and wheelchair follow. Pt does demonstrate improved stability in standing this session and improved ambulation distance.  Barriers to return to prior living situation: Patient currently needing assist x 2 for safe mobility and assist for dressing and self hygiene tasks. Family does report they are able to provide assist of 2 for at least a week (this information was from yesterday PM).  Recommendations for discharge: defer to BPCI care plan  Rationale for recommendations: defer to BPCI care plan       Entered by: Patricia Adrian 09/11/2017 9:31 AM        PM session: Patient performs supine to sit with mod A x 1. Sit to/from stand with mod A x 1 and min A of another. Patient able to ambulate 65' with FWW and CGA. Improved gait speed noted.

## 2017-09-11 NOTE — PLAN OF CARE
Problem: Goal Outcome Summary  Goal: Goal Outcome Summary    Discharge Planner OT   Patient plan for discharge: Home with assist from family  Current status:  Pt educated on compensatory technique for LE dressing (reports family will be assisting). Pt completed sit > stand from w/c to FWW with mod A, increased time required to achieve upright standing posture. Pt ambulated to/from BR FWW level with min A, slow pace, anxious with all mobility, constant reassurance provided and verbal cues for safe positioning. Pt transferred on/off RTS with min A, max vcs for safe descent and hand placement required. Pt required max A for toileting tasks including hygiene and management post BM. Pt with unsafe descent to w/c, educated on safe technique and positioning of RLE.   Barriers to return to prior living situation: Level of assist needed for ADLs, A of 2 for transfers and mobility, pain, fall risk, cognition, impaired safety  Recommendations for discharge: defer to BPCI care plan  Rationale for recommendations: defer to BPCI care plan       Entered by: Emmie Urban 09/11/2017 10:46 AM

## 2017-09-11 NOTE — PROGRESS NOTES
Medical issues remain stable. Chart reviewed. Was due to discharge home yesterday -- has some social concerns about discharge -- ortho to address.    Hospitalist service will sign off. Please call if questions arise prior to discharge.     Toya Delarosa,   Internal Medicine - Hospitalist  9/11/2017  3:11 PM

## 2017-09-11 NOTE — OP NOTE
Operative Note    Chris Villalobos MRN# [id number]   YOB: 1932 Age: 85 year old   Date of Procedure: 9/7/2017    1st Assistant: Mary Payne PA-C    PREOPERATIVE DIAGNOSIS: Right knee osteoarthritis, failure to respond to conservative management.     POSTOPERATIVE DIAGNOSIS: Right knee osteoarthritis, failure to respond to conservative management.     PROCEDURE: Right total knee arthroplasty, minimally invasive mid vastus technique, [employer] components, posterior stabilized.  Tourniquet-less technique.     DESCRIPTION OF PROCEDURE: Chris Villalobos was brought to the operating room. After satisfactory anesthesia, the right lower extremity was prepped and draped in the usual sterile fashion. The patient received 1 gram of Ancef preoperatively.     Straight anterior incision was made. Dissection was carried down to the extensor mechanism. Medial arthrotomy was made. Mid vastus exposure was developed.  Advanced OA was observed.   Patella was exposed, measured and resected to accept a size 41mm, asymmetric patella. The knee was then flexed up. Intramedullary guide was placed at 5 degrees as per the preoperative plan. The distal femoral cut was made followed by anteroposterior cuts and chamfer cuts. Box cut was made for the femoral component as well. Femur sized to be a size 5. Attention was then directed to the tibia. Tibial cut was made perpendicular to the long axis of the tibia in both AP and lateral planes, 3 degree posterior slope. The tibia sized to be a size 5. Soft tissue balancing was performed. Trial reduction was performed and with a 10-mm PS insert, there was excellent soft tissue balancing, patellar tracking, alignment as well as motion. Trial components were removed. Tibial baseplate was prepared for size 5 baseplate. All 3 components were cemented in place without difficulty. Excess cement was removed. The real 10-mm PS insert was impacted in place and then the wound was closed in sequential  layers including interrupted 0 Vicryl as well as a running 0 Stratafix suture in the extensor mechanism, interrupted 2-0 Vicryl, running 2-0 V-lock, and 3-0 subcuticular monocryl. Dermabond Prino dressing was applied. Sterile dressing was applied. The patient left the operating room in satisfactory condition. Patient received 1 gm of tranexamic acid pre-op and at closure, and a 3 minute dilute betadine soak was performed prior to closure.    DELFINO HARVEY MD

## 2017-09-11 NOTE — PLAN OF CARE
Problem: Goal Outcome Summary  Goal: Goal Outcome Summary  Outcome: Improving  Up with assist of 1 and walker . Complains of left knee pain , requested only tylenol . Good appetite . Voiding adequate amount . Vital signs stable . Dressing changed . CMS intact.

## 2017-09-11 NOTE — PROGRESS NOTES
"Orthopedic Surgery  9/11/2017  POD #4    S: Patient voices no complaints today.     O: Blood pressure 130/60, temperature 97.6  F (36.4  C), temperature source Oral, resp. rate 16, height 1.93 m (6' 4\"), weight 98.8 kg (217 lb 14.4 oz), SpO2 95 %.  Lab Results   Component Value Date    HGB 10.9 09/09/2017     Neurovascularly intact.  Calves are negative bilaterally, both soft and nontender.  The dressing is C/D/I.  The wound looks good with minimal erythema of the surrounding skin.    A: Mr. Villalobos is doing well status post Procedure(s):  ARTHROPLASTY KNEE.    P: D/C home.   Feels more functional, less pain than pre-op        Leonel Pederson  [phone]    "

## 2017-09-11 NOTE — PLAN OF CARE
Problem: Goal Outcome Summary  Goal: Goal Outcome Summary  Outcome: Improving  Pt denies pain. D/c paperwork reviewed with patient and son, Van. All questions answered. D/c to home at this time.

## 2017-09-11 NOTE — PROGRESS NOTES
Left message with Flavia from [facility] BPCI program.  Returned call before noon, and she understands that the patient stayed in the hospital overnight.  She stated she would get an update from MD to see if any plan changes, and I informed her that we will address transportation from hospital after we hear back from her.  Kiara Kline RN    Addendum: Flavia called back and said per otho, he is okay to be discharged today, and if anyone has a concern, they can contact Dr. Thrasher.  I passed on the message to the patient, and he stated he is interested in talking to Dr. Thrasher, as he says he does not feel he should leave today.  Message left with VISHNU Abbott for Dr. Thrasher. Kiara Kline RN    Addendum: Left message with Dr. Thrasher regarding patient's request to talk to the doctor about staying in the hospital 2 extra days.  Await return call. Kiara Kline RN     Addendum: Milena called back and said that the plan is for him to discharge home today after Dr. Thrasher comes to see him at 4:30pm.  Kiara Kline,RN

## 2017-09-11 NOTE — PLAN OF CARE
Problem: Goal Outcome Summary  Goal: Goal Outcome Summary  Physical Therapy Discharge Summary     Reason for therapy discharge:    Discharged to home with outpatient therapy.     Progress towards therapy goal(s). See goals on Care Plan in [facility] electronic health record for goal details.  Goals not met.  Barriers to achieving goals:   discharge from facility.     Therapy recommendation(s):    Not allowed to have a recommendation as pt is BPCI.

## 2017-09-12 NOTE — PROGRESS NOTES
SWS PROGRESS NOTE:     D/I: SW received a call from pt's son, Francesco, stating that pt is having a difficult time at home and requiring 2 people to transfer. Francesco would like to speak with someone about additional services to aid pt at home. SW passed along [facility] contact information for both Maricarmen and Flavia. SW also left a message for Flavia asking for her to reach out to Francesco.   P: SW will defer to [facility] staff to arrange additional services.     PIPPA Lopez, LGSW [phone]

## 2017-09-12 NOTE — PLAN OF CARE
Problem: Goal Outcome Summary  Goal: Goal Outcome Summary  Occupational Therapy Discharge Summary     Reason for therapy discharge:    Discharged to home.     Progress towards therapy goal(s). See goals on Care Plan in [facility] electronic health record for goal details.  Goals not met.  Barriers to achieving goals:   discharge from facility.     Therapy recommendation(s):    Continued therapy is recommended.  Rationale/Recommendations:  Not allowed to have discharge recommendation due to BPCI..

## 2017-09-14 NOTE — DISCHARGE SUMMARY
"Discharge Summary    Chris Villalobos MRN# [id number]   YOB: 1932 Age: 85 year old     Date of Admission: 9/7/2017    Date of Discharge: 9/11/2017    Reason for Admission: Chris Villalobos is an 85 year old male who was admitted to the hospital following surgery.    Preoperative Diagnosis: right knee djd    Postoperative Diagnosis: right knee djd    Procedure Completed:  Right total knee arthroplasty    Hospital Course:  Mr. Villalobos was admitted and underwent the above procedure. The patient tolerated the procedure well. There were no complications. Following surgery he was admitted to the floor.  During his stay he did not require any blood transfusions. His pain was controlled with oral pain medication.  During his stay he progressed well in physical therapy and all the therapy goals were met.     Discharge Physical Exam:  /60 (BP Location: Right arm)  Temp 97.6  F (36.4  C) (Oral)  Resp 16  Ht 1.93 m (6' 4\")  Wt 98.8 kg (217 lb 14.4 oz)  SpO2 95%  BMI 26.52 kg/m2  Neurovascularly intact, distal pulses present bilaterally.  Calves are negative bilaterally, both soft and nontender.    Assessment: Mr. Villalobos is stable and doing well status post right total knee arthroplasty.    Plan: We will discharge him home on analgesics and deep venous thrombosis prophylaxis.  He will follow-up with me approximately 2 weeks from surgery.  He may call [phone] to schedule an appointment.    Meds:   Chris Villalobos   Home Medication Instructions TONYA:[id number]    Printed on:09/14/17 0987   Medication Information                      Acetaminophen (TYLENOL PO)  Take 1,000 mg by mouth daily (2 x 500mg = 1000mg dose)             aspirin  MG EC tablet  Take 1 tablet (325 mg) by mouth 2 times daily             BusPIRone HCl (BUSPAR PO)  Take 10 mg by mouth 2 times daily as needed              diphenhydrAMINE-acetaminophen (TYLENOL PM)  MG tablet  Take 2 tablets by mouth At Bedtime           "   GABAPENTIN PO  Take 300 mg by mouth At Bedtime              latanoprost (XALATAN) 0.005 % ophthalmic solution  Place 1 drop into both eyes At Bedtime             LEVOTHYROXINE SODIUM PO  Take 25 mcg by mouth daily             LISINOPRIL PO  Take 5 mg by mouth daily              MetFORMIN HCl (GLUCOPHAGE XR PO)  Take 500 mg by mouth At Bedtime             Nitroglycerin (NITROSTAT SL)  Place 0.4 mg under the tongue every 5 minutes as needed for chest pain             oxyCODONE (ROXICODONE) 5 MG IR tablet  Take 0.5-1 tablets (2.5-5 mg) by mouth every 4 hours as needed for moderate to severe pain             senna-docusate (SENOKOT-S;PERICOLACE) 8.6-50 MG per tablet  Take 1-2 tablets by mouth 2 times daily as needed for constipation             tamsulosin (FLOMAX) 0.4 MG capsule  Take 0.4 mg by mouth At Bedtime

## 2018-01-01 ENCOUNTER — HOME CARE/HOSPICE - HEALTHEAST (OUTPATIENT)
Dept: HOSPICE | Facility: HOSPICE | Age: 83
End: 2018-01-01

## 2018-01-01 ENCOUNTER — RECORDS - HEALTHEAST (OUTPATIENT)
Dept: ADMINISTRATIVE | Facility: OTHER | Age: 83
End: 2018-01-01

## 2018-01-01 ENCOUNTER — AMBULATORY - HEALTHEAST (OUTPATIENT)
Dept: HOSPICE | Facility: HOSPICE | Age: 83
End: 2018-01-01

## 2018-01-01 ENCOUNTER — HOSPITAL LABORATORY (OUTPATIENT)
Dept: OTHER | Facility: CLINIC | Age: 83
End: 2018-01-01

## 2018-01-01 ENCOUNTER — NURSING HOME VISIT (OUTPATIENT)
Dept: GERIATRICS | Facility: CLINIC | Age: 83
End: 2018-01-01
Payer: MEDICARE

## 2018-01-01 ENCOUNTER — OFFICE VISIT - HEALTHEAST (OUTPATIENT)
Dept: CARDIOLOGY | Facility: CLINIC | Age: 83
End: 2018-01-01

## 2018-01-01 ENCOUNTER — TRANSFERRED RECORDS (OUTPATIENT)
Dept: HEALTH INFORMATION MANAGEMENT | Facility: CLINIC | Age: 83
End: 2018-01-01

## 2018-01-01 VITALS
DIASTOLIC BLOOD PRESSURE: 71 MMHG | RESPIRATION RATE: 18 BRPM | TEMPERATURE: 97.2 F | BODY MASS INDEX: 27.58 KG/M2 | WEIGHT: 226.5 LBS | SYSTOLIC BLOOD PRESSURE: 112 MMHG | OXYGEN SATURATION: 94 % | HEART RATE: 67 BPM | HEIGHT: 76 IN

## 2018-01-01 VITALS
HEART RATE: 107 BPM | DIASTOLIC BLOOD PRESSURE: 66 MMHG | SYSTOLIC BLOOD PRESSURE: 107 MMHG | OXYGEN SATURATION: 93 % | TEMPERATURE: 97.5 F | RESPIRATION RATE: 20 BRPM

## 2018-01-01 DIAGNOSIS — R11.2 NON-INTRACTABLE VOMITING WITH NAUSEA, UNSPECIFIED VOMITING TYPE: ICD-10-CM

## 2018-01-01 DIAGNOSIS — I25.10 ATHEROSCLEROSIS OF NATIVE CORONARY ARTERY OF NATIVE HEART WITHOUT ANGINA PECTORIS: ICD-10-CM

## 2018-01-01 DIAGNOSIS — R41.82 ALTERED MENTAL STATUS, UNSPECIFIED ALTERED MENTAL STATUS TYPE: ICD-10-CM

## 2018-01-01 DIAGNOSIS — I49.3 FREQUENT UNIFOCAL PREMATURE VENTRICULAR CONTRACTIONS: ICD-10-CM

## 2018-01-01 DIAGNOSIS — J18.9 PNEUMONIA DUE TO INFECTIOUS ORGANISM, UNSPECIFIED LATERALITY, UNSPECIFIED PART OF LUNG: Primary | ICD-10-CM

## 2018-01-01 DIAGNOSIS — E11.8 TYPE 2 DIABETES MELLITUS WITH COMPLICATION, UNSPECIFIED LONG TERM INSULIN USE STATUS: ICD-10-CM

## 2018-01-01 DIAGNOSIS — I10 ESSENTIAL HYPERTENSION: ICD-10-CM

## 2018-01-01 DIAGNOSIS — I71.40 ANEURYSM OF ABDOMINAL VESSEL (H): ICD-10-CM

## 2018-01-01 DIAGNOSIS — I48.20 CHRONIC ATRIAL FIBRILLATION (H): ICD-10-CM

## 2018-01-01 DIAGNOSIS — E78.00 HYPERCHOLESTEREMIA: ICD-10-CM

## 2018-01-01 DIAGNOSIS — R11.2 NAUSEA AND VOMITING, INTRACTABILITY OF VOMITING NOT SPECIFIED, UNSPECIFIED VOMITING TYPE: ICD-10-CM

## 2018-01-01 DIAGNOSIS — R19.7 DIARRHEA, UNSPECIFIED TYPE: ICD-10-CM

## 2018-01-01 DIAGNOSIS — M62.81 MUSCLE WEAKNESS (GENERALIZED): ICD-10-CM

## 2018-01-01 LAB
ALBUMIN SERPL-MCNC: 2.8 G/DL (ref 3.5–5)
ALP SERPL-CCNC: 131 U/L (ref 45–120)
ALT SERPL-CCNC: 16 U/L (ref 0–45)
ANION GAP SERPL CALCULATED.3IONS-SCNC: 5 MMOL/L (ref 3–14)
ANION GAP SERPL CALCULATED.3IONS-SCNC: 6 MMOL/L (ref 5–18)
ANION GAP SERPL CALCULATED.3IONS-SCNC: 7 MMOL/L (ref 5–18)
ANION GAP SERPL CALCULATED.3IONS-SCNC: 8 MMOL/L (ref 5–18)
AST SERPL-CCNC: 21 U/L (ref 0–40)
BILIRUB SERPL-MCNC: 0.5 MG/DL (ref 0–1)
BUN SERPL-MCNC: 16 MG/DL (ref 8–28)
BUN SERPL-MCNC: 20 MG/DL (ref 8–28)
BUN SERPL-MCNC: 22 MG/DL (ref 7–30)
BUN SERPL-MCNC: 22 MG/DL (ref 8–28)
CALCIUM SERPL-MCNC: 8.2 MG/DL (ref 0.7–1.3)
CALCIUM SERPL-MCNC: 8.4 MG/DL (ref 8.5–10.1)
CALCIUM SERPL-MCNC: 8.4 MG/DL (ref 8.5–10.5)
CALCIUM SERPL-MCNC: 8.5 MG/DL (ref 8.5–10.5)
CHLORIDE SERPL-SCNC: 103 MMOL/L (ref 94–109)
CHLORIDE SERPLBLD-SCNC: 104 MMOL/L (ref 98–107)
CHLORIDE SERPLBLD-SCNC: 104 MMOL/L (ref 98–107)
CHLORIDE SERPLBLD-SCNC: 105 MMOL/L (ref 98–107)
CO2 SERPL-SCNC: 22 MMOL/L (ref 22–31)
CO2 SERPL-SCNC: 25 MMOL/L (ref 22–31)
CO2 SERPL-SCNC: 26 MMOL/L (ref 22–31)
CO2 SERPL-SCNC: 30 MMOL/L (ref 20–32)
CREAT SERPL-MCNC: 0.82 MG/DL (ref 0.7–1.3)
CREAT SERPL-MCNC: 0.83 MG/DL (ref 0.7–1.3)
CREAT SERPL-MCNC: 0.94 MG/DL (ref 0.66–1.25)
CREAT SERPL-MCNC: 1.01 MG/DL (ref 0.7–1.3)
DIFFERENTIAL: ABNORMAL
DIFFERENTIAL: ABNORMAL
ERYTHROCYTE [DISTWIDTH] IN BLOOD BY AUTOMATED COUNT: 12.8 % (ref 11–14.5)
ERYTHROCYTE [DISTWIDTH] IN BLOOD BY AUTOMATED COUNT: 12.8 % (ref 11–14.5)
ERYTHROCYTE [DISTWIDTH] IN BLOOD BY AUTOMATED COUNT: 13.1 % (ref 10–15)
GFR SERPL CREATININE-BSD FRML MDRD: 76 ML/MIN/1.7M2
GFR SERPL CREATININE-BSD FRML MDRD: >60 ML/MIN/1.73M2
GLUCOSE SERPL-MCNC: 121 MG/DL (ref 70–125)
GLUCOSE SERPL-MCNC: 84 MG/DL (ref 70–99)
GLUCOSE SERPL-MCNC: 95 MG/DL (ref 70–125)
GLUCOSE SERPL-MCNC: 99 MG/DL (ref 70–125)
HBA1C MFR BLD: 6.2 % (ref 4.2–6.1)
HCT VFR BLD AUTO: 36 % (ref 40–54)
HCT VFR BLD AUTO: 36.1 % (ref 40–53)
HCT VFR BLD AUTO: 36.9 % (ref 40–54)
HEMOGLOBIN: 12 G/DL (ref 14–18)
HEMOGLOBIN: 12.4 G/DL (ref 14–18)
HGB BLD-MCNC: 12.3 G/DL (ref 13.3–17.7)
MCH RBC QN AUTO: 31.9 PG (ref 26.5–33)
MCH RBC QN AUTO: 32.6 PG (ref 27–34)
MCH RBC QN AUTO: 32.6 PG (ref 27–34)
MCHC RBC AUTO-ENTMCNC: 33.6 G/DL (ref 32–36)
MCHC RBC AUTO-ENTMCNC: 33.6 G/DL (ref 32–36)
MCHC RBC AUTO-ENTMCNC: 34.1 G/DL (ref 31.5–36.5)
MCV RBC AUTO: 94 FL (ref 78–100)
MCV RBC AUTO: 95 FL (ref 80–100)
MCV RBC AUTO: 97 FL (ref 80–100)
PLATELET # BLD AUTO: 102 THOU/UL (ref 140–440)
PLATELET # BLD AUTO: 122 THOU/UL (ref 140–440)
PLATELET # BLD AUTO: 122 THOU/UL (ref 140–440)
PLATELET # BLD AUTO: 136 THOU/UL (ref 140–440)
PLATELET # BLD AUTO: 172 10E9/L (ref 150–450)
POTASSIUM SERPL-SCNC: 4 MMOL/L (ref 3.5–5)
POTASSIUM SERPL-SCNC: 4.2 MMOL/L (ref 3.4–5.3)
POTASSIUM SERPL-SCNC: 4.2 MMOL/L (ref 3.5–5)
POTASSIUM SERPL-SCNC: 4.3 MMOL/L (ref 3.5–5)
PROT SERPL-MCNC: 5.8 G/DL (ref 6–8)
RBC # BLD AUTO: 3.78 MILL/UL (ref 4.4–6.2)
RBC # BLD AUTO: 3.8 MILL/UL (ref 4.4–6.2)
RBC # BLD AUTO: 3.85 10E12/L (ref 4.4–5.9)
SODIUM SERPL-SCNC: 133 MMOL/L (ref 136–145)
SODIUM SERPL-SCNC: 135 MMOL/L (ref 136–145)
SODIUM SERPL-SCNC: 138 MMOL/L (ref 133–144)
SODIUM SERPL-SCNC: 139 MMOL/L (ref 136–145)
WBC # BLD AUTO: 4.2 THOU/UL (ref 4–11)
WBC # BLD AUTO: 4.8 10E9/L (ref 4–11)
WBC # BLD AUTO: 5.8 THOU/UL (ref 4–11)

## 2018-01-01 PROCEDURE — 99305 1ST NF CARE MODERATE MDM 35: CPT | Performed by: INTERNAL MEDICINE

## 2018-01-01 PROCEDURE — 99310 SBSQ NF CARE HIGH MDM 45: CPT | Performed by: NURSE PRACTITIONER

## 2018-01-01 RX ORDER — POLYETHYLENE GLYCOL 3350 17 G/17G
17 POWDER, FOR SOLUTION ORAL DAILY PRN
COMMUNITY

## 2018-01-01 ASSESSMENT — MIFFLIN-ST. JEOR
SCORE: 1793.44
SCORE: 1759.88

## 2018-01-29 PROBLEM — I25.10 CAD (CORONARY ARTERY DISEASE): Status: ACTIVE | Noted: 2018-01-01

## 2018-01-29 PROBLEM — J18.9 PNEUMONIA: Status: ACTIVE | Noted: 2018-01-01

## 2018-01-29 PROBLEM — R19.7 DIARRHEA: Status: ACTIVE | Noted: 2018-01-01

## 2018-01-29 PROBLEM — I48.20 CHRONIC ATRIAL FIBRILLATION (H): Status: ACTIVE | Noted: 2018-01-01
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
## 2018-01-29 PROBLEM — M62.81 MUSCLE WEAKNESS (GENERALIZED): Status: ACTIVE | Noted: 2018-01-01

## 2018-01-29 PROBLEM — E11.9 DIABETES MELLITUS, TYPE 2 (H): Status: ACTIVE | Noted: 2018-01-01

## 2018-01-29 NOTE — PROGRESS NOTES
[facility]  PRIMARY CARE PROVIDER AND CLINIC:  No primary care provider on file. No primary physician on file.  Chief Complaint   Patient presents with     Hospital F/U       HPI:    Chris Villalobos is a 86 year old  (1/20/1932),admitted to the [facility] from [facility].  Hospital stay 1/24/18 through 1/28/18.  Admitted to this facility for  rehab, medical management and nursing care.  HPI information obtained from: facility chart records, facility staff, patient report and [facility] chart review.      Patient Chris Villalobos is a 86 yr old is a male admitted to [facility] for rehabilitation s/p hospitalization [facility] 1/24-1/28/18 for pneumonia/influenza with weakness and recent fall. He reports diarrhea s/p laxatives taken due to constipation. He has history of colon resection and bowel obstruction.  Patient noted to be hypotensive with Afib with RVR in hospital. Patient lives at [facility].    Current issues are:         Pneumonia due to infectious organism, unspecified laterality, unspecified part of lung  On Levofloxacin  Given Tamiflu course    Diarrhea, unspecified type  Resolved, monitor bowel movement  as needed miralax    Chronic atrial fibrillation (H)  History holter monitor, cardiology 1/11/18 for premature complexes, follow up cardiologist for ECHO  hypertension  On lisinopril  HLD  On Simvastatin    Type 2 diabetes mellitus with complication, unspecified long term insulin use status (H)  On Metformin    Neuropathy  On neurontin     Mood  On buspar    Muscle weakness (generalized)    CODE STATUS/ADVANCE DIRECTIVES DISCUSSION:   CPR/Full code   Patient's living condition: lives in an assisted living facility    ALLERGIES:Dilaudid [hydromorphone]  PAST MEDICAL HISTORY:  has a past medical history of Abdominal aneurysm (H); Bladder cancer (H); Cancer (H); Heart disease; Hypertension; MI (myocardial infarction); and Prostate cancer  "(H).  PAST SURGICAL HISTORY:  has a past surgical history that includes GI surgery; appendectomy; Eye surgery; Cardiac surgery; Abdomen surgery; basal cell; turp; excis tumor,soft tiss forearm/wrist,subq; and Arthroplasty knee (Right, 9/7/2017).  FAMILY HISTORY: family history is not on file.  SOCIAL HISTORY:  reports that he has quit smoking. His smoking use included Cigarettes. He has never used smokeless tobacco. He reports that he does not drink alcohol or use illicit drugs.    Post Discharge Medication Reconciliation Status: discharge medications reconciled and changed, per note/orders (see AVS).  Current Outpatient Prescriptions   Medication Sig Dispense Refill     VITAMIN D, CHOLECALCIFEROL, PO Take 2,000 Units by mouth daily       GABAPENTIN PO Take 300 mg by mouth 3 times daily       LEVOFLOXACIN PO Take 750 mg by mouth daily       SIMVASTATIN PO Take 20 mg by mouth At Bedtime       CYANOCOBALAMIN PO Take 1,000 mcg by mouth daily       MetFORMIN HCl (GLUCOPHAGE XR PO) Take 500 mg by mouth daily (with breakfast)        BusPIRone HCl (BUSPAR PO) Take 10 mg by mouth daily as needed        Nitroglycerin (NITROSTAT SL) Place 0.4 mg under the tongue every 5 minutes as needed for chest pain       Acetaminophen (TYLENOL PO) Take 500 mg by mouth every 6 hours as needed        latanoprost (XALATAN) 0.005 % ophthalmic solution Place 1 drop into both eyes At Bedtime       LISINOPRIL PO Take 5 mg by mouth daily          ROS:  10 point ROS of systems including Constitutional, Eyes, Respiratory, Cardiovascular, Gastroenterology, Genitourinary, Integumentary, Muscularskeletal, Psychiatric were all negative except for pertinent positives noted in my HPI.    Exam:  /71  Pulse 67  Temp 97.2  F (36.2  C)  Resp 18  Ht 6' 4\" (1.93 m)  Wt 226 lb 8 oz (102.7 kg)  SpO2 94%  BMI 27.57 kg/m2  GENERAL APPEARANCE:  Alert, in no distress  ENT:  Mouth and posterior oropharynx normal, moist mucous membranes  EYES:  EOM, " conjunctivae, lids, pupils and irises normal  NECK:  No adenopathy,masses or thyromegaly  RESP:  respiratory effort and palpation of chest normal, lungs clear to auscultation , no respiratory distress  CV:  Palpation and auscultation of heart done , regular rate and rhythm, no murmur, rub, or gallop  ABDOMEN:  normal bowel sounds, soft, nontender, no hepatosplenomegaly or other masses  M/S:   lying in bed  SKIN:  Inspection of skin and subcutaneous tissue baseline   Thick long toenails  NEURO:   Cranial nerves 2-12 are normal tested and grossly at patient's baseline  PSYCH:  oriented X 3    BP Readings from Last 3 Encounters:   01/29/18 112/71   09/11/17 130/60   06/08/17 141/66     P: 65-75  BS:  0900: 108  2000: 143  Wt Readings from Last 5 Encounters:   01/29/18 226 lb 8 oz (102.7 kg)   09/07/17 217 lb 14.4 oz (98.8 kg)   06/08/17 224 lb 14.4 oz (102 kg)       Lab/Diagnostic data:  HM1 (CBC with Diff) (01/28/2018 8:09 AM)  HM1 (CBC with Diff) (01/28/2018 8:09 AM)   Component Value Ref Range   WBC 4.2 4.0 - 11.0 thou/uL   RBC 3.78 (L) 4.40 - 6.20 mill/uL   Hemoglobin 12.0 (L) 14.0 - 18.0 g/dL   Hematocrit 36.0 (L) 40.0 - 54.0 %   MCV 95 80 - 100 fL   MCH 31.7 27.0 - 34.0 pg   MCHC 33.3 32.0 - 36.0 g/dL   RDW 12.9 11.0 - 14.5 %   Platelets 125 (L) 140 - 440 thou/uL   MPV 9.0 8.5 - 12.5 fL   Neutrophils % 58 50 - 70 %   Lymphocytes % 25 20 - 40 %   Monocytes % 9 2 - 10 %   Eosinophils % 8 (H) 0 - 6 %   Basophils % 1 0 - 2 %   Neutrophils Absolute 2.4 2.0 - 7.7 thou/uL   Lymphocytes Absolute 1.0 0.8 - 4.4 thou/uL   Monocytes Absolute 0.4 0.0 - 0.9 thou/uL   Eosinophils Absolute 0.3 0.0 - 0.4 thou/uL   Basophils Absolute 0.0 0.0 - 0.2 thou/uL     Comprehensive Metabolic Panel (01/28/2018 8:09 AM)  Comprehensive Metabolic Panel (01/28/2018 8:09 AM)   Component Value Ref Range   Sodium 139 136 - 145 mmol/L   Potassium 4.2 3.5 - 5.0 mmol/L   Chloride 105 98 - 107 mmol/L   CO2 26 22 - 31 mmol/L   Anion Gap, Calculation 8  5 - 18 mmol/L   Glucose 99 70 - 125 mg/dL   BUN 20 8 - 28 mg/dL   Creatinine 0.83 0.70 - 1.30 mg/dL   GFR MDRD Af Amer >60 >60 mL/min/1.73m2   GFR MDRD Non Af Amer >60 >60 mL/min/1.73m2   Bilirubin, Total 0.5 0.0 - 1.0 mg/dL   Calcium 8.4 (L) 8.5 - 10.5 mg/dL   Protein, Total 5.8 (L) 6.0 - 8.0 g/dL   Albumin 2.8 (L) 3.5 - 5.0 g/dL   Alkaline Phosphatase 131 (H) 45 - 120 U/L   AST 21 0 - 40 U/L   ALT 16 0 - 45 U/L       Platelet Count - every other day x 3 (01/28/2018 5:49 AM)  Platelet Count - every other day x 3 (01/28/2018 5:49 AM)   Component Value Ref Range   Platelets 136 (L) 140 - 440 thou/uL     Platelet Count - every other day x 3 (01/26/2018 5:42 AM)  Platelet Count - every other day x 3 (01/26/2018 5:42 AM)   Component Value Ref Range   Platelets 102 (L) 140 - 440 thou/uL     Troponin I (01/26/2018 5:42 AM)  Troponin I (01/26/2018 5:42 AM)   Component Value Ref Range   Troponin I 0.02 0.00 - 0.29 ng/mL         ASSESSMENT/PLAN:  Pneumonia due to infectious organism, unspecified laterality, unspecified part of lung  Complete course of levaquin, monitor   Clinically appear resolved  Flu  No longer on Tamiflu  Signs and symptoms appear resolved, monitor     Diarrhea, unspecified type  Resolved, monitor     Chronic atrial fibrillation (H)  hypertension  HLD  Vitals stable, continue lisinopril, monitor     Type 2 diabetes mellitus with complication, unspecified long term insulin use status (H)  Stable  Remains on Metformin, monitor     Muscle weakness (generalized)  Comment: d/t recent hospitalization & comorbidity, expect delay rehabilitation d/t age & comorbidity  Plan: PT/OT eval & treat, monitor    involved in safe plan home           Total time spent with patient visit at the skilled nursing facility was 40 min including patient visit and review of past records. Greater than 50% of total time spent with counseling and coordinating care due to pt status, chart review & care coordination      Electronically signed by:  LALIT Duckworth CNP

## 2018-02-05 PROBLEM — Z96.651 STATUS POST TOTAL KNEE REPLACEMENT, RIGHT: Status: RESOLVED | Noted: 2017-01-01 | Resolved: 2018-01-01

## 2018-02-05 NOTE — PROGRESS NOTES
[facility]    Chief Complaint   Patient presents with     Nursing Home Acute       HPI:    Chris Villalobos is a 86 year old  (1/20/1932), who is being seen today for an episodic care visit at [facility].  HPI information obtained from: facility chart records, facility staff and patient report.Today's concern is:    Patient Chris Villalobos is a 86 yr old is a male admitted to [facility] for rehabilitation s/p hospitalization [facility] 1/24-1/28/18 for pneumonia/influenza with weakness and recent fall. He reports diarrhea s/p laxatives taken due to constipation. He has history of colon resection and bowel obstruction.  Patient noted to be hypotensive with Afib with RVR in hospital. Patient lives at [facility].     Pneumonia due to infectious organism, unspecified laterality, unspecified part of lung  Nausea and vomiting, intractability of vomiting not specified, unspecified vomiting type  Altered mental status, unspecified altered mental status type     Per nurse report today patient had nausea that started over the weekend. He was started on as needed Zofran  Regular stools, unclear etiology. Patient has poor intake. Staff report regular urine output  SBP slightly low & has tachycardia   Completed course Levaquin & Tamiflu  No viral gastroenteritis on unit.  Patient has increased lethargy and confusion today.  This AM patient states he felt better, however, he did not eat breakfast or lunch and had x2 large emesis of brown yellow emesis  Discussed patient status with patient and son Bo & plan is to be further evaluated in hospital. Patient has cardiac history & possible complications, possible neurological event, or infection/gi issue with risk for worsening status due to persistent nausea with poor intake x3days    ALLERGIES: Dilaudid [hydromorphone]  Past Medical, Surgical, Family and Social History reviewed and updated in [employer].    Current Outpatient  Prescriptions   Medication Sig Dispense Refill     polyethylene glycol (MIRALAX/GLYCOLAX) powder Take 17 g by mouth daily as needed for constipation       ONDANSETRON PO Take 4 mg by mouth every 6 hours as needed for nausea       VITAMIN D, CHOLECALCIFEROL, PO Take 2,000 Units by mouth daily       GABAPENTIN PO Take 300 mg by mouth 3 times daily       SIMVASTATIN PO Take 20 mg by mouth At Bedtime       CYANOCOBALAMIN PO Take 1,000 mcg by mouth daily       MetFORMIN HCl (GLUCOPHAGE XR PO) Take 500 mg by mouth daily (with breakfast)        BusPIRone HCl (BUSPAR PO) Take 10 mg by mouth daily as needed        Nitroglycerin (NITROSTAT SL) Place 0.4 mg under the tongue every 5 minutes as needed for chest pain       Acetaminophen (TYLENOL PO) Take 500 mg by mouth every 6 hours as needed        latanoprost (XALATAN) 0.005 % ophthalmic solution Place 1 drop into both eyes At Bedtime       LISINOPRIL PO Take 5 mg by mouth daily        Medications reviewed:  Medications reconciled to facility chart and changes were made to reflect current medications as identified as above med list. Below are the changes that were made:   Medications stopped since last EPIC medication reconciliation:   There are no discontinued medications.    Medications started since last [facility] medication reconciliation:  Orders Placed This Encounter   Medications     polyethylene glycol (MIRALAX/GLYCOLAX) powder     Sig: Take 17 g by mouth daily as needed for constipation     ONDANSETRON PO     Sig: Take 4 mg by mouth every 6 hours as needed for nausea         REVIEW OF SYSTEMS:  10 point ROS of systems including Constitutional, Eyes, Respiratory, Cardiovascular, Gastroenterology, Genitourinary, Integumentary, Muscularskeletal, Psychiatric were all negative except for pertinent positives noted in my HPI.    Physical Exam:  /66  Pulse 107  Temp 97.5  F (36.4  C)  Resp 20  SpO2 93%  GENERAL APPEARANCE:  Alert, lethargic  ENT:  Mouth and posterior  oropharynx normal, moist mucous membranes  EYES:  EOM, conjunctivae, lids, pupils and irises normal  NECK:  No adenopathy,masses or thyromegaly  RESP:  respiratory effort and palpation of chest normal, lungs clear to auscultation , no respiratory distress  CV:  Palpation and auscultation of heart done, elevated heart rate and reg rhythm, no murmur, rub, or gallop  ABDOMEN:  normal bowel sounds, soft, nontender, no hepatosplenomegaly or other masses  M/S:   lying in bed  SKIN:  Inspection of skin and subcutaneous tissue baseline  NEURO:   Cranial nerves 2-12 are normal tested and grossly at patient's baseline  PSYCH:  oriented X 3     BP: 107-120/66-79  P:   BS:  0800: 102-154  1200: 143-177  1600: 118-149  2000: 152-192  Wt Readings from Last 5 Encounters:   01/29/18 226 lb 8 oz (102.7 kg)   09/07/17 217 lb 14.4 oz (98.8 kg)   06/08/17 224 lb 14.4 oz (102 kg)         Recent Labs:  CBC RESULTS:   Recent Labs   Lab Test  01/31/18   0651  01/28/18   0549 01/28/18   WBC  4.8   --   4.2   RBC  3.85*   --   3.78*   HGB  12.3*   --   12.0*   HCT  36.1*   --   36.0*   MCV  94   --   95   MCH  31.9   --   32.6   MCHC  34.1   --   33.6   RDW  13.1   --   12.8   PLT  172  136*  122*       Last Basic Metabolic Panel:  Recent Labs   Lab Test  01/31/18   0651 01/28/18   NA  138  139   POTASSIUM  4.2  4.2   CHLORIDE  103  105   ADRIANO  8.4*  8.4*   CO2  30  26   BUN  22  20   CR  0.94  0.83   GLC  84  99       Liver Function Studies -   Recent Labs   Lab Test 01/28/18   PROTTOTAL  5.8*   ALBUMIN  2.8*   BILITOTAL  0.5   ALKPHOS  131*   AST  21   ALT  16       Lab Results   Component Value Date    A1C 6.2 01/25/2018         Assessment/Plan:     Pneumonia due to infectious organism, unspecified laterality, unspecified part of lung  Nausea and vomiting, intractability of vomiting not specified, unspecified vomiting type  Altered mental status, unspecified altered mental status type     Send to ER to evaluate and treat due to  worsening patient status with increased confusion  He has persistent nausea with poor intake  Patient has hx cardiac history, concern for potential cardiac event, neurological event or gastrointestinal complication/gallbladder/viral gastroenteritis      Electronically signed by  LALIT Duckworth CNP

## 2018-02-06 NOTE — PROGRESS NOTES
[facility]  PRIMARY CARE PROVIDER AND CLINIC:  No primary care provider on file. No primary physician on file.      Pt was seen by Dr Martinez on 2/4/18 for an initial TCU visit.    HPI:    Chris Villalobos is a 86 year old  (1/20/1932),admitted to the [facility] from [facility].  Hospital stay 1/24/18 through 1/28/18 for the treatment of influenza        Admitted to this facility for  rehab, medical management and nursing care.     .      Patient is a 86 yr old is a male admitted to [facility] for rehabilitation s/p hospitalization [facility] 1/24-1/28/18 for pneumonia/influenza with weakness and recent fall. Pt received a course of tamiflu and was discharged on levofloxacin.    Pt reports feeling better overall  He denies chest pain, SOB, palpitations, cough  Pt had a large emesis during the night, feels better today  He denies abd pain today  Pt has had loose stools since admission, but improved with d/c of laxatives.  Strength is gradually improving      CODE STATUS/ADVANCE DIRECTIVES DISCUSSION:   CPR/Full code   Patient's living condition: lives in an assisted living facility    ALLERGIES:Dilaudid [hydromorphone]  PAST MEDICAL HISTORY:  has a past medical history of Abdominal aneurysm (H); Bladder cancer (H); Cancer (H); Heart disease; Hypertension; MI (myocardial infarction); and Prostate cancer (H).  PAST SURGICAL HISTORY:  has a past surgical history that includes GI surgery; appendectomy; Eye surgery; Cardiac surgery; Abdomen surgery; basal cell; turp; excis tumor,soft tiss forearm/wrist,subq; and Arthroplasty knee (Right, 9/7/2017).  FAMILY HISTORY: family history is not on file.  SOCIAL HISTORY:  reports that he has quit smoking. His smoking use included Cigarettes. He has never used smokeless tobacco. He reports that he does not drink alcohol or use illicit drugs.        Post Discharge Medication Reconciliation Status:   .  Current Outpatient  Prescriptions   Medication Sig Dispense Refill     polyethylene glycol (MIRALAX/GLYCOLAX) powder Take 17 g by mouth daily as needed for constipation       ONDANSETRON PO Take 4 mg by mouth every 6 hours as needed for nausea       VITAMIN D, CHOLECALCIFEROL, PO Take 2,000 Units by mouth daily       GABAPENTIN PO Take 300 mg by mouth 3 times daily       SIMVASTATIN PO Take 20 mg by mouth At Bedtime       CYANOCOBALAMIN PO Take 1,000 mcg by mouth daily       MetFORMIN HCl (GLUCOPHAGE XR PO) Take 500 mg by mouth daily (with breakfast)        BusPIRone HCl (BUSPAR PO) Take 10 mg by mouth daily as needed        Nitroglycerin (NITROSTAT SL) Place 0.4 mg under the tongue every 5 minutes as needed for chest pain       Acetaminophen (TYLENOL PO) Take 500 mg by mouth every 6 hours as needed        latanoprost (XALATAN) 0.005 % ophthalmic solution Place 1 drop into both eyes At Bedtime       LISINOPRIL PO Take 5 mg by mouth daily          ROS:  10 point ROS neg except as noted above    Exam:    GENERAL APPEARANCE:  Alert, in no distress, thin, lying in bed  ENT:  Mouth and posterior oropharynx normal, moist mucous membranes  EYES:  EOM, conjunctivae, lids, pupils and irises normal  NECK:  supple  RESP:  RR 12, lungs clear  CV:  RRR  ABDOMEN: soft, non-distended, non-tender  M/S: No LE edema  SKIN:  No rash  NEURO:   Cranial nerves 2-12 are normal. No focal weakness  PSYCH:  oriented X 3, pleasant    BP Readings from Last 3 Encounters:   02/05/18 107/66   01/29/18 112/71   09/11/17 130/60     P: 65-75  BS:  0900: 108  2000: 143  Wt Readings from Last 5 Encounters:   01/29/18 226 lb 8 oz (102.7 kg)   09/07/17 217 lb 14.4 oz (98.8 kg)   06/08/17 224 lb 14.4 oz (102 kg)       Lab/Diagnostic data:  HM1 (CBC with Diff) (01/28/2018 8:09 AM)  HM1 (CBC with Diff) (01/28/2018 8:09 AM)   Component Value Ref Range   WBC 4.2 4.0 - 11.0 thou/uL   RBC 3.78 (L) 4.40 - 6.20 mill/uL   Hemoglobin 12.0 (L) 14.0 - 18.0 g/dL   Hematocrit 36.0 (L)  40.0 - 54.0 %   MCV 95 80 - 100 fL   MCH 31.7 27.0 - 34.0 pg   MCHC 33.3 32.0 - 36.0 g/dL   RDW 12.9 11.0 - 14.5 %   Platelets 125 (L) 140 - 440 thou/uL   MPV 9.0 8.5 - 12.5 fL   Neutrophils % 58 50 - 70 %   Lymphocytes % 25 20 - 40 %   Monocytes % 9 2 - 10 %   Eosinophils % 8 (H) 0 - 6 %   Basophils % 1 0 - 2 %   Neutrophils Absolute 2.4 2.0 - 7.7 thou/uL   Lymphocytes Absolute 1.0 0.8 - 4.4 thou/uL   Monocytes Absolute 0.4 0.0 - 0.9 thou/uL   Eosinophils Absolute 0.3 0.0 - 0.4 thou/uL   Basophils Absolute 0.0 0.0 - 0.2 thou/uL     Comprehensive Metabolic Panel (01/28/2018 8:09 AM)  Comprehensive Metabolic Panel (01/28/2018 8:09 AM)   Component Value Ref Range   Sodium 139 136 - 145 mmol/L   Potassium 4.2 3.5 - 5.0 mmol/L   Chloride 105 98 - 107 mmol/L   CO2 26 22 - 31 mmol/L   Anion Gap, Calculation 8 5 - 18 mmol/L   Glucose 99 70 - 125 mg/dL   BUN 20 8 - 28 mg/dL   Creatinine 0.83 0.70 - 1.30 mg/dL   GFR MDRD Af Amer >60 >60 mL/min/1.73m2   GFR MDRD Non Af Amer >60 >60 mL/min/1.73m2   Bilirubin, Total 0.5 0.0 - 1.0 mg/dL   Calcium 8.4 (L) 8.5 - 10.5 mg/dL   Protein, Total 5.8 (L) 6.0 - 8.0 g/dL   Albumin 2.8 (L) 3.5 - 5.0 g/dL   Alkaline Phosphatase 131 (H) 45 - 120 U/L   AST 21 0 - 40 U/L   ALT 16 0 - 45 U/L       Platelet Count - every other day x 3 (01/28/2018 5:49 AM)  Platelet Count - every other day x 3 (01/28/2018 5:49 AM)   Component Value Ref Range   Platelets 136 (L) 140 - 440 thou/uL     Platelet Count - every other day x 3 (01/26/2018 5:42 AM)  Platelet Count - every other day x 3 (01/26/2018 5:42 AM)   Component Value Ref Range   Platelets 102 (L) 140 - 440 thou/uL     Troponin I (01/26/2018 5:42 AM)  Troponin I (01/26/2018 5:42 AM)   Component Value Ref Range   Troponin I 0.02 0.00 - 0.29 ng/mL         ASSESSMENT/PLAN:    Pneumonia due to infectious organism, unspecified laterality, unspecified part of lung  Influenza  Respiratory status improved, Pt has completed courses of Levaquin and  Tamiflu  Plan monitor resp status, PT/OT    Emesis, last night  Etiology not clear  Abd is benign, Pt currently denies abd pain nausea  Plan monitor bowel status, further evaluation if recurrent symptoms    Chronic atrial fibrillation (H)  hypertension  HLD  HR controlled  Plan continue current medications, monitor BP,HR    Type 2 diabetes mellitus with complication, unspecified long term insulin use status (H)  Stable on metformin  Plan monitor BG, monitor GI status      Leo Martinez MD           Total time spent with patient visit at the skilled nursing facility was 40 min including patient visit and review of past records. Greater than 50% of total time spent with counseling and coordinating care due to pt status, chart review & care coordination     Electronically signed by:  Leo Martinez MD

## 2021-05-29 ENCOUNTER — RECORDS - HEALTHEAST (OUTPATIENT)
Dept: ADMINISTRATIVE | Facility: CLINIC | Age: 86
End: 2021-05-29

## 2021-05-31 ENCOUNTER — RECORDS - HEALTHEAST (OUTPATIENT)
Dept: ADMINISTRATIVE | Facility: CLINIC | Age: 86
End: 2021-05-31

## 2021-05-31 VITALS — BODY MASS INDEX: 27.89 KG/M2 | HEIGHT: 76 IN | WEIGHT: 229 LBS

## 2021-05-31 VITALS — WEIGHT: 226.4 LBS | BODY MASS INDEX: 27.57 KG/M2 | HEIGHT: 76 IN

## 2021-06-01 ENCOUNTER — RECORDS - HEALTHEAST (OUTPATIENT)
Dept: ADMINISTRATIVE | Facility: CLINIC | Age: 86
End: 2021-06-01

## 2021-06-01 VITALS — HEIGHT: 76 IN | WEIGHT: 219 LBS | BODY MASS INDEX: 26.67 KG/M2

## 2021-06-03 ENCOUNTER — RECORDS - HEALTHEAST (OUTPATIENT)
Dept: ADMINISTRATIVE | Facility: CLINIC | Age: 86
End: 2021-06-03

## 2021-06-09 ENCOUNTER — RECORDS - HEALTHEAST (OUTPATIENT)
Dept: ADMINISTRATIVE | Facility: CLINIC | Age: 86
End: 2021-06-09

## 2021-06-12 NOTE — PROGRESS NOTES
[facility]   Outpatient Follow-up evaluation.      Current Outpatient Prescriptions:      acetaminophen (TYLENOL) 500 MG tablet, Take 500 mg by mouth every 6 (six) hours as needed for pain., Disp: , Rfl:      busPIRone (BUSPAR) 10 MG tablet, TK 1 T PO  DAILY PRA, Disp: , Rfl: 2     gabapentin (NEURONTIN) 100 MG capsule, Take 300 mg by mouth daily. 2 tablet  mg  By mouth at  Bedtime., Disp: , Rfl:      levothyroxine (SYNTHROID, LEVOTHROID) 25 MCG tablet, TK 1 T PO QD, Disp: , Rfl: 0     lisinopril (PRINIVIL,ZESTRIL) 20 MG tablet, Take 5 mg by mouth daily. , Disp: , Rfl:      metFORMIN (GLUCOPHAGE) 500 MG tablet, Take 500 mg by mouth daily with breakfast. , Disp: , Rfl:      simvastatin (ZOCOR) 20 MG tablet, TAKE 1 TABLET BY MOUTH DAILY, Disp: 90 tablet, Rfl: 0     cholecalciferol, vitamin D3, 1,000 unit tablet, Take 2,000 Units by mouth daily., Disp: , Rfl:      cyanocobalamin (VITAMIN B-12) 1000 MCG tablet, Take 1,000 mcg by mouth daily., Disp: , Rfl:         Chris Villalobos is a 85 y.o. Male    Chief Complaint   Patient presents with     Follow-up       Diagnoses:  See Problem list     Recommendations:    In view of the review of his recent studies I think it is safe to proceed with surgery as planned.  Currently under consideration for whether to have general or local anesthesia for his knee procedure.  I think either could be considerable although would favor a local anesthetic approach.  Continue with all current medical therapy no changes required at this time.  We will see back again about one years time.  Hopefully with a good outcome from surgery he can increase his level physical activity.      Subjective:   Mr. Villalobos returns to heart care clinic for presurgical reassessment.  Sadly Mr. Villalobos's wife passed away this past year.  She had long-term illness.  He is recently moved from the [facility] to a new facility also and he cannot.  I seen his son were here today for  assessment.    He did not have any chest pain pressure or tightness.  His level of activity has been progressively declining because of pain in his right knee.  On September 7 he is having surgery at [facility] to have this taken care of.  He has not had chest pain or pressure breathing is been stable.  Tolerating all his medications well no dizziness or lightheadedness.  He recently had a cardiac echo showing no new changes in his cardiac function.  Left left ventricular function is mildly reduced.  Had previous coronary occlusion and infarction of the inferior wall.  Nuclear scans consistently show an ischemic defect with inferior inferolateral wall in the territory of the circumflex artery which is not extensive collateralized.  Previous stents to the right coronary have been patent confirmed most recently in angiography about a year ago.                    Past Medical History:   Diagnosis Date     AAA (abdominal aortic aneurysm)      Aneurysm of aorta      Arrhythmia     pvc     Bladder cancer      Cancer      Coronary artery disease 2009     Diabetes mellitus      Diabetic neuropathic arthritis      Hyperlipidemia      Hypertension      PAC (premature atrial contraction)      Prostate CA      PVC (premature ventricular contraction)      Past Surgical History:   Procedure Laterality Date     CARDIAC CATHETERIZATION  2009     CARDIAC CATHETERIZATION  09/28/2016     [place] INSERT INTRACORONARY STENT  2009    prca, circ 100 % occluded     Allergies   Allergen Reactions     No Known Drug Allergies      Family History   Problem Relation Age of Onset     Sudden death Father 69      Social History     Social History     Marital status:      Spouse name: N/A     Number of children: N/A     Years of education: N/A     Occupational History     Not on file.     Social History Main Topics     Smoking status: Former Smoker     Smokeless tobacco: Never Used      Comment: quit 40 years ago     Alcohol use No     Drug use:  "Not on file     Sexual activity: Not on file      Comment: did not ask     Other Topics Concern     Not on file     Social History Narrative     Family history not pertinent to chief complaint or presenting problem    Current Outpatient Prescriptions:      acetaminophen (TYLENOL) 500 MG tablet, Take 500 mg by mouth every 6 (six) hours as needed for pain., Disp: , Rfl:      busPIRone (BUSPAR) 10 MG tablet, TK 1 T PO  DAILY PRA, Disp: , Rfl: 2     gabapentin (NEURONTIN) 100 MG capsule, Take 300 mg by mouth daily. 2 tablet  mg  By mouth at  Bedtime., Disp: , Rfl:      levothyroxine (SYNTHROID, LEVOTHROID) 25 MCG tablet, TK 1 T PO QD, Disp: , Rfl: 0     lisinopril (PRINIVIL,ZESTRIL) 20 MG tablet, Take 5 mg by mouth daily. , Disp: , Rfl:      metFORMIN (GLUCOPHAGE) 500 MG tablet, Take 500 mg by mouth daily with breakfast. , Disp: , Rfl:      simvastatin (ZOCOR) 20 MG tablet, TAKE 1 TABLET BY MOUTH DAILY, Disp: 90 tablet, Rfl: 0     cholecalciferol, vitamin D3, 1,000 unit tablet, Take 2,000 Units by mouth daily., Disp: , Rfl:      cyanocobalamin (VITAMIN B-12) 1000 MCG tablet, Take 1,000 mcg by mouth daily., Disp: , Rfl:       Objective:   /70 (Patient Site: Left Arm, Patient Position: Sitting, Cuff Size: Adult Large)  Pulse 68  Resp 18  Ht 6' 4\" (1.93 m)  Wt (!) 229 lb (103.9 kg)  BMI 27.87 kg/m2  (!) 229 lb (103.9 kg)   Wt Readings from Last 3 Encounters:   08/30/17 (!) 229 lb (103.9 kg)   10/18/16 198 lb (89.8 kg)   10/03/16 200 lb (90.7 kg)     BP Readings from Last 3 Encounters:   08/30/17 112/70   10/18/16 106/60   10/03/16 104/68     Pulse Readings from Last 3 Encounters:   08/30/17 68   10/18/16 68   10/03/16 60     General appearance: alert, appears stated age and cooperative  Head: Normocephalic, without obvious abnormality, atraumatic  Eyes: Normal external exam without jaundice.  Ears: Normal external auricular exam.  Nose: Normal external exam.  Lungs: clear to auscultation bilaterally  Chest wall: " no tenderness  Heart: regular rate and rhythm, S1, S2 normal, no murmur, click, rub or gallop soft 1/6 systolic ejection murmur.  Pulses: 2+ and symmetric  Skin: Skin color, texture, turgor normal.   Neurologic: Grossly normal, no focal neurologic findings.    Review of Systems:   General: WNL  Cardiographics: Reviewed in clinic.    Lab Results:  Lab Results: Personally reviewed  No results found for: CHOL, TRIG, HDL, LDLDIRECT    Clinical evaluation time today including exam 20 minutes.  At least 50% of clinic evaluation time involved in assessment and patient counseling.  Part of this chart was created using a dictation software.  Typographic errors, word substitutions, and grammatical errors may unintentionally occur.    Jayro Saunders M.D.  [facility]

## 2021-06-15 NOTE — PROGRESS NOTES
Cardiology Progress Note        Assessment:  Frequent premature beats pulse rate assessed initially today to be a low but a very careful examination with auscultation of frequent premature beats are not detected as the peripheral pulse but are felt on carotid waveform and auscultatory.  Believe he is not actually sinus bradycardic but actually apparent bradycardia due to limited translation of pulses from premature beats.  Abdominal aortic aneurysm without symptomatic manifestation and screening advised      Recommendations:  We will rearrange for abdominal ultrasound to reassess abdominal aortic aneurysm and cardiac echo to assess left ventricular function view of frequent ectopy.    Subjective:  Interval History: Patient reports no new symptoms.  Underwent knee surgery without complication.  Breathing is stable.  No change in exercise tolerance.  No dizziness lightheadedness or syncope.  Current Outpatient Prescriptions   Medication Sig Dispense Refill     acetaminophen (TYLENOL) 500 MG tablet Take 500 mg by mouth every 6 (six) hours as needed for pain.       busPIRone (BUSPAR) 10 MG tablet TK 1 T PO  DAILY PRA  2     cholecalciferol, vitamin D3, 1,000 unit tablet Take 2,000 Units by mouth daily.       cyanocobalamin (VITAMIN B-12) 1000 MCG tablet Take 1,000 mcg by mouth daily.       gabapentin (NEURONTIN) 300 MG capsule TK 1 C PO TID FOR 90 DAYS  1     latanoprost (XALATAN) 0.005 % ophthalmic solution INT 1 GTT IN OU QPM  3     lisinopril (PRINIVIL,ZESTRIL) 20 MG tablet Take 5 mg by mouth daily.        metFORMIN (GLUCOPHAGE) 500 MG tablet Take 500 mg by mouth daily with breakfast.        nitroglycerin (NITROSTAT) 0.4 MG SL tablet TK 1 T SUBLINGUALLY  Q 5 MINUTES PRF CHEST PAIN.  0     simvastatin (ZOCOR) 20 MG tablet TAKE 1 TABLET BY MOUTH DAILY 90 tablet 0     gabapentin (NEURONTIN) 100 MG capsule Take 300 mg by mouth daily. 2 tablet  mg  By mouth at  Bedtime.       levothyroxine (SYNTHROID, LEVOTHROID) 25 MCG  tablet TK 1 T PO QD  0     No current facility-administered medications for this visit.          Objective:   Vital signs in last 24 hours:  [unfilled]  Weight:   (!) 226 lb 6.4 oz (102.7 kg)     Review of Systems:  A 12 point comprehensive review of systems was negative except as noted.   Family history not pertinent to chief complaint or presenting problem  Physical Exam:  Vitals:    01/11/18 1250   BP: 118/50   Pulse: (!) 48     Head and neck without focal cranial neurologic defects.  JVD not distended.  Carotid upstroke normal without bruit.  External eye exam normal without icterus.  External ear exam normal.  Neck without cervical lymphadenopathy or thyromegaly.  Lungs: clear to auscultation bilaterally  Heart: regular rate and rhythm, S1, S2 normal, no murmur, click, rub or gallop and Patient with frequent premature beats with very low amplitude pulses are barely perceptible.  Primarily in the carotid but not in the peripheral artery.   Abdomen with normal bowel tones.  Skin without rash, ecchymosis, lesions.  Neuromuscular tone normal.  Peripheral pulse intact and equal.  Joints without swelling or erythema.    Wt Readings from Last 3 Encounters:   01/11/18 (!) 226 lb 6.4 oz (102.7 kg)   08/30/17 (!) 229 lb (103.9 kg)   10/18/16 198 lb (89.8 kg)     Temp Readings from Last 3 Encounters:   09/29/16 97.6  F (36.4  C) (Oral)     BP Readings from Last 3 Encounters:   01/11/18 118/50   08/30/17 112/70   10/18/16 106/60     Pulse Readings from Last 3 Encounters:   01/11/18 (!) 48   08/30/17 68   10/18/16 68        SpO2 Readings from Last 3 Encounters:   09/29/16 95%     [unfilled]    Cardiographics:     ECG: All ECGs were personally reviewed and noted.Recent Holter that shows normal sinus rhythm with frequent premature beats  Echocardiogram: findings as noted    Lab Results:   Lab results personally reviewed.    Lab Results   Component Value Date     09/29/2016    K 4.4 09/29/2016     09/29/2016     CO2 29 09/29/2016    BUN 21 09/29/2016    CREATININE 1.06 09/29/2016    CALCIUM 9.4 09/29/2016     INR/Prothrombin Time: No results found for: INR, PROTIME    Current Outpatient Prescriptions:      acetaminophen (TYLENOL) 500 MG tablet, Take 500 mg by mouth every 6 (six) hours as needed for pain., Disp: , Rfl:      busPIRone (BUSPAR) 10 MG tablet, TK 1 T PO  DAILY PRA, Disp: , Rfl: 2     cholecalciferol, vitamin D3, 1,000 unit tablet, Take 2,000 Units by mouth daily., Disp: , Rfl:      cyanocobalamin (VITAMIN B-12) 1000 MCG tablet, Take 1,000 mcg by mouth daily., Disp: , Rfl:      gabapentin (NEURONTIN) 300 MG capsule, TK 1 C PO TID FOR 90 DAYS, Disp: , Rfl: 1     latanoprost (XALATAN) 0.005 % ophthalmic solution, INT 1 GTT IN OU QPM, Disp: , Rfl: 3     lisinopril (PRINIVIL,ZESTRIL) 20 MG tablet, Take 5 mg by mouth daily. , Disp: , Rfl:      metFORMIN (GLUCOPHAGE) 500 MG tablet, Take 500 mg by mouth daily with breakfast. , Disp: , Rfl:      nitroglycerin (NITROSTAT) 0.4 MG SL tablet, TK 1 T SUBLINGUALLY  Q 5 MINUTES PRF CHEST PAIN., Disp: , Rfl: 0     simvastatin (ZOCOR) 20 MG tablet, TAKE 1 TABLET BY MOUTH DAILY, Disp: 90 tablet, Rfl: 0     gabapentin (NEURONTIN) 100 MG capsule, Take 300 mg by mouth daily. 2 tablet  mg  By mouth at  Bedtime., Disp: , Rfl:      levothyroxine (SYNTHROID, LEVOTHROID) 25 MCG tablet, TK 1 T PO QD, Disp: , Rfl: 0  Time spent in clinical evaluation including counseling was 30   minutes.  Part of this chart was created using a dictation software.  Typographic errors, word substitutions, and Grammatical errors may unintentionally occur.

## (undated) DEVICE — DRAPE IOBAN INCISE 23X17" 6650EZ

## (undated) DEVICE — ESU BIPOLAR SEALER AQUAMANTYS 6MM 23-112-1

## (undated) DEVICE — GLOVE PROTEXIS MICRO 6.5  2D73PM65

## (undated) DEVICE — WRAP EZY KNEE

## (undated) DEVICE — BONE CEMENT MIXEVAC III HI VAC KIT  0206-015-000

## (undated) DEVICE — PREP CHLORAPREP 26ML TINTED ORANGE  260815

## (undated) DEVICE — SU WND CLOSURE VLOC 180 ABS 2-0 24" GS-21 VLOCL0335

## (undated) DEVICE — GLOVE PROTEXIS POWDER FREE 6.5 ORTHOPEDIC 2D73ET65

## (undated) DEVICE — BLADE CLIPPER 4412A

## (undated) DEVICE — ESU GROUND PAD UNIVERSAL W/O CORD

## (undated) DEVICE — DRAPE SHEET REV FOLD 3/4 9349

## (undated) DEVICE — BLADE SAW RECIP STRK 70X12.5X1.2MM 0277-096-281

## (undated) DEVICE — PACK TOTAL KNEE SOP15TKFSD

## (undated) DEVICE — MANIFOLD NEPTUNE 4 PORT 700-20

## (undated) DEVICE — BONE CLEANING TIP INTERPULSE  0210-010-000

## (undated) DEVICE — BONE CEMENT MIXEVAC HI VAC W/CARTRIDGE 0306-563-000

## (undated) DEVICE — DRAPE STERI U 1015

## (undated) DEVICE — ESU PENCIL W/SMOKE EVAC NEPTUNE STRYKER 0703-046-000

## (undated) DEVICE — SU STRATAFIX PDS PLUS 0 CT 45CM SXPP1A406

## (undated) DEVICE — LINEN TOWEL PACK X5 5464

## (undated) DEVICE — SUCTION IRR SYSTEM W/O TIP INTERPULSE HANDPIECE 0210-100-000

## (undated) DEVICE — GLOVE PROTEXIS BLUE W/NEU-THERA 7.0  2D73EB70

## (undated) DEVICE — SYR 50ML LL W/O NDL 309653

## (undated) DEVICE — GLOVE PROTEXIS POWDER FREE 8.5 ORTHOPEDIC 2D73ET85

## (undated) DEVICE — DRSG GAUZE 4X4" 3033

## (undated) DEVICE — DRSG ADAPTIC 3X8" 6113

## (undated) DEVICE — SOL BENZOIN 0.5OZ

## (undated) DEVICE — SOL NACL 0.9% IRRIG 1000ML BOTTLE 07138-09

## (undated) DEVICE — SU VICRYL 2-0 CP-1 27" UND J266H

## (undated) DEVICE — SU MONOCRYL 3-0 PS-2 27" Y427H

## (undated) DEVICE — SU VICRYL 0 CTX CR 8X18" J764D

## (undated) DEVICE — NDL 22GA 1.5"

## (undated) DEVICE — CAST PADDING 6" UNSTERILE 9046

## (undated) DEVICE — Device

## (undated) DEVICE — SOL NACL 0.9% INJ 250ML BAG 2B1322Q

## (undated) DEVICE — GLOVE PROTEXIS W/NEU-THERA 8.5  2D73TE85

## (undated) DEVICE — DRSG ABDOMINAL 07 1/2X8" 7197D

## (undated) DEVICE — DRSG STERI STRIP 1/2X4" R1547

## (undated) DEVICE — BLADE SAW SAGITTAL STRK 19.5X95X1.27MM 2108-109-000S15

## (undated) RX ORDER — FENTANYL CITRATE 50 UG/ML
INJECTION, SOLUTION INTRAMUSCULAR; INTRAVENOUS
Status: DISPENSED
Start: 2017-01-01

## (undated) RX ORDER — BUPIVACAINE HYDROCHLORIDE AND EPINEPHRINE 5; 5 MG/ML; UG/ML
INJECTION, SOLUTION EPIDURAL; INTRACAUDAL; PERINEURAL
Status: DISPENSED
Start: 2017-01-01

## (undated) RX ORDER — PROPOFOL 10 MG/ML
INJECTION, EMULSION INTRAVENOUS
Status: DISPENSED
Start: 2017-01-01

## (undated) RX ORDER — CEFAZOLIN SODIUM 2 G/100ML
INJECTION, SOLUTION INTRAVENOUS
Status: DISPENSED
Start: 2017-01-01

## (undated) RX ORDER — KETOROLAC TROMETHAMINE 30 MG/ML
INJECTION, SOLUTION INTRAMUSCULAR; INTRAVENOUS
Status: DISPENSED
Start: 2017-01-01

## (undated) RX ORDER — ONDANSETRON 2 MG/ML
INJECTION INTRAMUSCULAR; INTRAVENOUS
Status: DISPENSED
Start: 2017-01-01

## (undated) RX ORDER — LIDOCAINE HYDROCHLORIDE 10 MG/ML
INJECTION, SOLUTION EPIDURAL; INFILTRATION; INTRACAUDAL; PERINEURAL
Status: DISPENSED
Start: 2017-01-01

## (undated) RX ORDER — CEFAZOLIN SODIUM 1 G/3ML
INJECTION, POWDER, FOR SOLUTION INTRAMUSCULAR; INTRAVENOUS
Status: DISPENSED
Start: 2017-01-01

## (undated) RX ORDER — DEXAMETHASONE SODIUM PHOSPHATE 10 MG/ML
INJECTION, SOLUTION INTRAMUSCULAR; INTRAVENOUS
Status: DISPENSED
Start: 2017-01-01